# Patient Record
Sex: MALE | Race: WHITE | NOT HISPANIC OR LATINO | Employment: FULL TIME | URBAN - METROPOLITAN AREA
[De-identification: names, ages, dates, MRNs, and addresses within clinical notes are randomized per-mention and may not be internally consistent; named-entity substitution may affect disease eponyms.]

---

## 2024-04-15 PROBLEM — H93.13 TINNITUS, BILATERAL: Status: ACTIVE | Noted: 2024-04-15

## 2024-04-15 PROBLEM — H61.20 EXCESSIVE CERUMEN IN EAR CANAL: Status: ACTIVE | Noted: 2024-04-15

## 2024-04-15 PROBLEM — H90.6 MIXED HEARING LOSS, BILATERAL: Status: ACTIVE | Noted: 2024-04-15

## 2024-04-15 PROBLEM — H60.502 ACUTE OTITIS EXTERNA OF LEFT EAR: Status: ACTIVE | Noted: 2024-04-15

## 2024-04-15 PROBLEM — J35.2 ADENOIDAL HYPERTROPHY: Status: ACTIVE | Noted: 2024-04-15

## 2024-04-15 PROBLEM — H90.3 SENSORINEURAL HEARING LOSS, ASYMMETRICAL: Status: ACTIVE | Noted: 2024-04-15

## 2024-04-15 PROBLEM — H65.22 LEFT CHRONIC SEROUS OTITIS MEDIA: Status: ACTIVE | Noted: 2024-04-15

## 2024-04-15 RX ORDER — IRBESARTAN 300 MG/1
300 TABLET ORAL DAILY
COMMUNITY
Start: 2021-03-11

## 2024-04-20 NOTE — PROGRESS NOTES
Subjective   Patient ID: Prakash Villalta II is a 58 y.o. male who presents for No chief complaint on file..  HPI  This 58-year-old male last seen in this office in April 2023 by one of my previous associates is being seen in follow-up.  He has a history of mixed hearing loss in both ears complicated at times by excessive cerumen buildup.  It appears by old records that he did at times have some granular tissue involving the left tympanic membrane causing otorrhea which was treated by Ciprodex drops.  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  According to his last examination there was in the left ear by exam and myringostapediopexy bilaterally.  Review of Systems  A 12 point ROS has been reviewed and are negative for complaint except what is stated in the history of present illness and/or past medical history as noted in the EMR    Past Medical History:   Diagnosis Date    Personal history of other diseases of the circulatory system     History of hypertension        Current Outpatient Medications:     irbesartan (Avapro) 300 mg tablet, Take 1 tablet (300 mg) by mouth once daily., Disp: , Rfl:      Past Surgical History:   Procedure Laterality Date    ADENOIDECTOMY  08/11/2016    Adenoidectomy    NASAL SEPTUM SURGERY  08/11/2016    Nasal Septal Deviation Repair    TYMPANOSTOMY TUBE PLACEMENT  08/11/2016    Ear Pressure Equalization Tube      Social History     Tobacco Use    Smoking status: Not on file    Smokeless tobacco: Not on file   Substance Use Topics    Alcohol use: Not on file        Not on File     There were no vitals taken for this visit.       Objective   Physical Exam  EXAMINATION:    GENERAL SINDI.EARANCE: Alert, in no acute distress, normal pitch and clarity of voice, well-developed and nourished, cooperative.    HEAD/FACE:  Normocephalic, atraumatic, normal facial movements and strength, no no tenderness to palpation, no lesions noted.    SKIN: Normal turgor, no raised or ulcerative lesions, warm and dry to palpation.    EYES: Extraocular motions intact, no nystagmus noted, pupils equal and reactive to light and accommodation, no conjunctivitis.    EARS: Both ears--external ear anatomy is normal without lesions, auditory canals are patent and without skin abrasions or lesions, hearing is intact to voice, t see procedure note for remaining exam     NOSE: No external skin lesions are noted, nares are patent, septum is intact, sinuses are nontender to palpation bilaterally, no intranasal lesions or inflammation is noted, nasal valve is normal.    OROPHARYNX/ORAL CAVITY: Oropharynx is not inflamed and is without lesions, mucosa of the oral cavity is intact and without lesions, tongue is midline and mobile, no acute dental disease is noted, TMJs are mobile, positive submucous cleft to palpation    LUNG-- NO wheezing or rhonchi normal respiratory effort    HEART-- No venous congestion,  rate and rhythm regular,    NECK: No lymphadenopathy is palpated, carotid pulses are intact, neck is supple with full range of motion, no thyroid abnormalities are noted, trachea is midline, no neck masses are palpated.    LYMPHATICS: No cervical adenopathy or supraclavicular adenopathy is palpated.    NEUROLOGIC/PSYCH; alert and oriented, cranial nerves are grossly intact, gait is without falling, no motor deficits are noted.        Patient ID: Prakash Villalta II is a 58 y.o. male.    Ear cerumen removal    Date/Time: 4/24/2024 3:53 PM    Performed by: Tye Hayward DMD, MD  Authorized by: Tye Hayward DMD, MD    Consent:     Consent obtained:  Verbal    Consent given by:  Patient    Risks discussed:  Pain and incomplete removal    Alternatives discussed:  No treatment and alternative treatment  Universal protocol:     Procedure explained and  questions answered to patient or proxy's satisfaction: yes      Imaging studies available: no      Required blood products, implants, devices, and special equipment available: no      Patient identity confirmed:  Verbally with patient  Procedure details:     Location:  L ear and R ear    Procedure type: curette      Procedure type comment:  Or suction    Procedure outcomes: cerumen removed    Post-procedure details:     Inspection:  No bleeding and ear canal clear    Hearing quality:  Improved    Procedure completion:  Tolerated well, no immediate complications  Comments:      Microscopic evaluation of both ears was done.  On the right-hand side there was ceruminous debris in the anterior one third of the canal removed with a curette.  The tympanic membrane was negative for middle ear fluid however there was significant retraction of the posterior TM within myringostapediopexy.  There was some soft tissue debris around the attic defect that was removed with suction and tab knife.  No active otorrhea or granulation tissue was noted.  On the left-hand side there was some ceruminous buildup in the anterior third of the canal removed with a curette.  There was in the remaining of the canal dried blood and secretions obstructed to the canal which was lifted and removed with tab knife alligator forceps.  At the level of the tympanic membrane posteriorly and in the attic area there was what appeared to be a significant retraction with granulation tissue very friable.  There was some dried debris around the edges which could not be removed without further irritating the granulation tissue.  There did not appear to be an active buildup of middle ear fluid.    Assessment/Plan   Problem List Items Addressed This Visit             ICD-10-CM    Mixed hearing loss, bilateral - Primary H90.6    Tinnitus, bilateral H93.13     Other Visit Diagnoses         Codes    Attic retraction pocket     H73.899    Granulation tissue of ear  canal     L92.9          I discussed the clinical finds with the patient.  He does have significant scarring of both tympanic membranes however on the left-hand side there is granulation tissue in the posterior superior quadrant with surrounding dried epithelial and blood.  This was cleaned down to that level however a small amount was a necessarily left behind due to the granulation tissue and risk of bleeding.  He will be placed on Ciprodex drops 3 drops twice a day for 10 days then at night until seen back in the office in 4 weeks.  Will plan to do his hearing test at that follow-up visit.  Once this seems to be stabilized then he will stay on a checkup scheduled to monitor the situation.  If his hearing changes or if there is any suggestion of ingrowth of epithelium in these defects and a CT scan of the temporal bone would be advised.     Tye Hayward DMD, MD 04/20/24 1:48 PM

## 2024-04-24 ENCOUNTER — CLINICAL SUPPORT (OUTPATIENT)
Dept: AUDIOLOGY | Facility: CLINIC | Age: 59
End: 2024-04-24
Payer: COMMERCIAL

## 2024-04-24 ENCOUNTER — CLINICAL SUPPORT (OUTPATIENT)
Dept: AUDIOLOGY | Facility: CLINIC | Age: 59
End: 2024-04-24

## 2024-04-24 ENCOUNTER — OFFICE VISIT (OUTPATIENT)
Dept: OTOLARYNGOLOGY | Facility: CLINIC | Age: 59
End: 2024-04-24
Payer: COMMERCIAL

## 2024-04-24 VITALS — BODY MASS INDEX: 29.8 KG/M2 | WEIGHT: 220 LBS | HEIGHT: 72 IN

## 2024-04-24 DIAGNOSIS — H93.13 TINNITUS, BILATERAL: ICD-10-CM

## 2024-04-24 DIAGNOSIS — L92.9 GRANULATION TISSUE OF EAR CANAL: ICD-10-CM

## 2024-04-24 DIAGNOSIS — H90.6 MIXED HEARING LOSS, BILATERAL: Primary | ICD-10-CM

## 2024-04-24 DIAGNOSIS — H61.23 BILATERAL IMPACTED CERUMEN: ICD-10-CM

## 2024-04-24 DIAGNOSIS — H69.91 ETD (EUSTACHIAN TUBE DYSFUNCTION), RIGHT: Primary | ICD-10-CM

## 2024-04-24 DIAGNOSIS — H73.899 ATTIC RETRACTION POCKET: ICD-10-CM

## 2024-04-24 PROCEDURE — 69210 REMOVE IMPACTED EAR WAX UNI: CPT | Performed by: OTOLARYNGOLOGY

## 2024-04-24 PROCEDURE — 92567 TYMPANOMETRY: CPT | Performed by: AUDIOLOGIST

## 2024-04-24 PROCEDURE — 99214 OFFICE O/P EST MOD 30 MIN: CPT | Performed by: OTOLARYNGOLOGY

## 2024-04-24 PROCEDURE — V5267 HEARING AID SUP/ACCESS/DEV: HCPCS | Performed by: AUDIOLOGIST

## 2024-04-24 PROCEDURE — 1036F TOBACCO NON-USER: CPT | Performed by: OTOLARYNGOLOGY

## 2024-04-24 RX ORDER — ATORVASTATIN CALCIUM 20 MG/1
20 TABLET, FILM COATED ORAL DAILY
COMMUNITY
Start: 2024-01-29

## 2024-04-24 RX ORDER — CIPROFLOXACIN AND DEXAMETHASONE 3; 1 MG/ML; MG/ML
SUSPENSION/ DROPS AURICULAR (OTIC)
Qty: 7.5 ML | Refills: 1 | Status: SHIPPED | OUTPATIENT
Start: 2024-04-24

## 2024-04-24 RX ORDER — HYDROCHLOROTHIAZIDE 25 MG/1
25 TABLET ORAL DAILY
COMMUNITY
Start: 2024-01-29

## 2024-04-24 NOTE — PROGRESS NOTES
TYMPANOMETRY EVALUATION      Name:  Prakash Villalta II  :  1965  Age:  58 y.o.  Date of Evaluation:  24   Referring Provider:  Dr. Hayward     History:  Mr. Pawan CANTU was seen today for an evaluation of hearing.  Patient reported tinnitus, bilaterally and needing to pop his left ear.  When asked, patient denied otalgia, aural fullness, and concerns for decreased hearing sensitivity    See audiometric evaluation at end of this report or scanned under media tab    OTOSCOPY:       Right Ear: Minimal non-occluding cerumen       Left Ear: Abnormal appearance to TM/discoloration    226 Hz TYMPANOMETRY:       Right Ear: Type B: Flat with large ear canal volume, consistent with patent PE tube or perforation       Left Ear: Type A: normal peak pressure, compliance, and ear canal volume, consistent with middle ear function within normal limits    NOTE: Due to abnormal appearance of left otoscopy and concern for wax/medical involvement, patient was brought to Dr. Hayward.  Dr. Hayward decided to defer remainder of audiometric evaluation at this time.    RECOMMENDATIONS:  -Recommend patient return should concerns for changes in hearing sensitivity arise or as medically indicated.     Kenya Khan, CCC-A     Appt: 3:00 - 3:15 PM

## 2024-04-24 NOTE — PROGRESS NOTES
[facility]  HEARING AID CHECK      Name:  Prakash Villalta II  YOB: 1965  Today's date:  04/24/24      PATIENT ISSUES/CONCERNS AND OTOSCOPIC RESULTS  Otoscopic was clear following Dr. Hayward visit.  Patient reports that the hearing aids have been working well.    HEARING AID INSPECTION AND ADJUSTMENT  Hearing aids were cleaned and checked.  Replaced filters, domes and sport locks.  Left  wire somewhat loose but not intermittent.  Listening check was good.        Patient reports no issues or concerns.  No need for adjustment today.    FOLLOW UP   The patient was asked to contact the office if they notice any significant change in hearing level or hearing aid function.    Otherwise, will follow up again in 6 months.    APPOINTMENT TIME  4:00-4:30pm    Kenya Roque  Doctor of Audiology  Senior Audiologist

## 2024-05-16 NOTE — PROGRESS NOTES
Subjective   Patient ID: Prakash Villalta II is a 59 y.o. male who presents for No chief complaint on file..  HPI  This 59-year-old male is being seen back in the office.  He had previously been followed for a number of years by one of my previous associates.  He has a history of mixed hearing loss in both ears and a history of intermittent cerumen impactions.  He has had granulation tissue involving the left tympanic membrane the past causing otorrhea.  Many years earlier he had undergone nasal surgery including septoplasty turbinate reduction and he had myringotomy tubes used at that time.  Past exams have shown evidence for myringostapediopexy's bilaterally.  At his last visit, after removing cerumen on the left side there was dried blood and mucoid secretions which was removed and at the level of the tympanic membrane and in the attic area there was significant retraction with granulation tissue.  He was placed on Ciprodex drops twice a day for 10 days and then at night until seen back for this visit.  I had mentioned the possibility of obtaining a CT scan of the temporal bone if necessary for further evaluation.  He also has a posterior superior retraction on the right which collects some skin residue.  No otorrhea has been noted on that side.  Review of Systems  A 12 point ROS has been reviewed and are negative for complaint except what is stated in the history of present illness and/or past medical history as noted in the EMR    Active Ambulatory Problems     Diagnosis Date Noted    Acute otitis externa of left ear 04/15/2024    Adenoidal hypertrophy 04/15/2024    Excessive cerumen in ear canal 04/15/2024    Left chronic serous otitis media 04/15/2024    Mixed hearing loss, bilateral 04/15/2024    Sensorineural hearing loss, asymmetrical 04/15/2024    Tinnitus, bilateral 04/15/2024     Resolved Ambulatory Problems     Diagnosis Date Noted    No Resolved Ambulatory Problems     Past Medical History:   Diagnosis  Date    Personal history of other diseases of the circulatory system            Current Outpatient Medications:     atorvastatin (Lipitor) 20 mg tablet, Take 1 tablet (20 mg) by mouth once daily., Disp: , Rfl:     ciprofloxacin-dexamethasone (CiproDEX) otic suspension, 3 drops twice a day to the left ear for 10 days then at night until seen back in the office., Disp: 7.5 mL, Rfl: 1    hydroCHLOROthiazide (HYDRODiuril) 25 mg tablet, Take 1 tablet (25 mg) by mouth once daily., Disp: , Rfl:     irbesartan (Avapro) 300 mg tablet, Take 1 tablet (300 mg) by mouth once daily., Disp: , Rfl:      No Known Allergies     Social History     Tobacco Use    Smoking status: Never    Smokeless tobacco: Never   Substance Use Topics    Alcohol use: Not Currently      Objective   Physical Exam  EXAMINATION:     GENERAL SINDI.EARANCE: Alert, in no acute distress, normal pitch and clarity of voice, well-developed and nourished, cooperative.     HEAD/FACE: Normocephalic, atraumatic, normal facial movements and strength, no no tenderness to palpation, no lesions noted.     SKIN: Normal turgor, no raised or ulcerative lesions, warm and dry to palpation.     EYES: Extraocular motions intact, no nystagmus noted, pupils equal and reactive to light and accommodation, no conjunctivitis.     EARS: See procedure note      NOSE: No external skin lesions are noted, nares are patent, septum is intact, sinuses are nontender to palpation bilaterally, no intranasal lesions or inflammation is noted, nasal valve is normal.     OROPHARYNX/ORAL CAVITY: Oropharynx is not inflamed and is without lesions, mucosa of the oral cavity is intact and without lesions, tongue is midline and mobile, no acute dental disease is noted, TMJs are mobile, positive submucous cleft to palpation     LUNG-- NO wheezing or rhonchi normal respiratory effort     HEART-- No venous congestion,  rate and rhythm regular,     NECK: No lymphadenopathy is palpated, carotid pulses are  intact, neck is supple with full range of motion, no thyroid abnormalities are noted, trachea is midline, no neck masses are palpated.     LYMPHATICS: No cervical adenopathy or supraclavicular adenopathy is palpated.     NEUROLOGIC/PSYCH; alert and oriented, cranial nerves are grossly intact, gait is without falling, no motor deficits are noted.    Patient ID: Prakash Villalta II is a 59 y.o. male.    Binocular microscopy    Date/Time: 5/20/2024 3:51 PM    Performed by: Tye Hayward DMD, MD  Authorized by: Tye Hayward DMD, MD    Consent:     Consent obtained:  Verbal    Consent given by:  Patient    Risks discussed:  Pain    Alternatives discussed:  No treatment and observation  Post-procedure details:     Patient tolerance of procedure:  Tolerated well, no immediate complications  Comments:      Microscopic evaluation on the right-hand side revealed a posterior TM retraction especially in the posterior superior quadrant.  There is a pocket there with some epithelial debris within it which was able to be mostly removed with a Britt needle and cup forceps.  No middle ear fluid was noted.  On the left-hand side there was dried blood and secretions overlapping the posterior superior quadrant.  This was lifted with a Britt needle and removed with an alligator forcep.  A deeper retraction pocket in the attic is noted with still some remnant of granulation tissue.  This could be consistent with an attic cholesteatoma.    His audiogram today was positive for bilateral mixed hearing loss moderate in both ears at 125 Hz mild on the right ear from 500 to 2000 Hz then a moderate loss is noted in the upper frequencies of sound on the left-hand side there with similar readings except for normal thresholds at 15 102,000 Hz.  Discrimination scores are 92% bilaterally 60 dB on the right 50 dB on the left.  Tympanograms revealed a type a pattern on the left tympanic membrane perforation pattern on the right Assessment/Plan    Problem List Items Addressed This Visit             ICD-10-CM    Mixed hearing loss, bilateral - Primary H90.6    Tinnitus, bilateral H93.13     Other Visit Diagnoses         Codes    Attic retraction pocket     H73.899    Relevant Orders    CT internal auditory canals posterior fossa wo IV contrast    Granulation tissue of ear canal     L92.9    Otorrhea of left ear     H92.12    Cholesteatoma of attic of both ears     H71.03    Relevant Orders    CT internal auditory canals posterior fossa wo IV contrast             I discussed the clinical finds with the patient.  His left ear otorrhea and bleeding has improved with the use of the Ciprodex.  Now that the ear is cleaned there is a small area of granulation tissue is still present in that attic retraction area and I we will continue him on the Ciprodex drops at night until it is completed.  CT scan of the temporal bone is going to be ordered in order to see if there is any suggestion of an attic cholesteatoma which would require surgical care.  Will need to review those results and if it is suggestive of that then a consultation with otology would be recommended.  If not then he should be kept on a's cleaning schedule using Floxin drops intermittently to lubricate these areas.    From a hearing standpoint he is mostly stable with only very slight fluctuations from past test.  Discrimination ability is slightly lower than it was 1 year ago but is still in a normal range at 92%.    He will be contacted with the results of the CT scan of the temporal bone with any other short-term assessments that he needs otherwise he should be seen again in July for ear debridement.    This patient is advised to follow up with their PCP for all other health care issues and treatment. Dictation was done with dragon transcription and errors in spelling  and diction are possible.    Tye Hayward DMD, MD 05/16/24 5:14 PM

## 2024-05-20 ENCOUNTER — OFFICE VISIT (OUTPATIENT)
Dept: OTOLARYNGOLOGY | Facility: CLINIC | Age: 59
End: 2024-05-20
Payer: COMMERCIAL

## 2024-05-20 ENCOUNTER — APPOINTMENT (OUTPATIENT)
Dept: AUDIOLOGY | Facility: CLINIC | Age: 59
End: 2024-05-20
Payer: COMMERCIAL

## 2024-05-20 ENCOUNTER — CLINICAL SUPPORT (OUTPATIENT)
Dept: AUDIOLOGY | Facility: CLINIC | Age: 59
End: 2024-05-20
Payer: COMMERCIAL

## 2024-05-20 DIAGNOSIS — H71.03 CHOLESTEATOMA OF ATTIC OF BOTH EARS: ICD-10-CM

## 2024-05-20 DIAGNOSIS — H73.899 ATTIC RETRACTION POCKET: ICD-10-CM

## 2024-05-20 DIAGNOSIS — L92.9 GRANULATION TISSUE OF EAR CANAL: ICD-10-CM

## 2024-05-20 DIAGNOSIS — H90.6 MIXED HEARING LOSS, BILATERAL: Primary | ICD-10-CM

## 2024-05-20 DIAGNOSIS — H93.13 TINNITUS, BILATERAL: ICD-10-CM

## 2024-05-20 DIAGNOSIS — H72.11 ATTIC PERFORATION OF TYMPANIC MEMBRANE OF RIGHT EAR: ICD-10-CM

## 2024-05-20 DIAGNOSIS — H92.12 OTORRHEA OF LEFT EAR: ICD-10-CM

## 2024-05-20 PROCEDURE — 99214 OFFICE O/P EST MOD 30 MIN: CPT | Performed by: OTOLARYNGOLOGY

## 2024-05-20 PROCEDURE — 92567 TYMPANOMETRY: CPT | Performed by: AUDIOLOGIST

## 2024-05-20 PROCEDURE — 1036F TOBACCO NON-USER: CPT | Performed by: OTOLARYNGOLOGY

## 2024-05-20 PROCEDURE — 92557 COMPREHENSIVE HEARING TEST: CPT | Performed by: AUDIOLOGIST

## 2024-05-20 PROCEDURE — 1036F TOBACCO NON-USER: CPT | Performed by: AUDIOLOGIST

## 2024-05-20 PROCEDURE — 92504 EAR MICROSCOPY EXAMINATION: CPT | Performed by: OTOLARYNGOLOGY

## 2024-05-20 ASSESSMENT — PATIENT HEALTH QUESTIONNAIRE - PHQ9
2. FEELING DOWN, DEPRESSED OR HOPELESS: NOT AT ALL
1. LITTLE INTEREST OR PLEASURE IN DOING THINGS: NOT AT ALL
SUM OF ALL RESPONSES TO PHQ9 QUESTIONS 1 AND 2: 0

## 2024-05-20 ASSESSMENT — COLUMBIA-SUICIDE SEVERITY RATING SCALE - C-SSRS: 1. IN THE PAST MONTH, HAVE YOU WISHED YOU WERE DEAD OR WISHED YOU COULD GO TO SLEEP AND NOT WAKE UP?: NO

## 2024-05-20 NOTE — PROGRESS NOTES
[facility]  AUDIOMETRIC EVALUATION      Name:  Prakash Villalta II   :  1965  Age:  59 y.o.  Date of Evaluation:  24    HISTORY    Prakash Villalta II is seen today at the request of Tye Hayward M.D., ADAM., F.A.C.S.    EVALUATION  See scanned audiogram in Media and included at the end of this report.    RESULTS    Otoscopic Evaluation:  Both ears were examined and cleaned under the microscope by Dr. Hayward prior to the audiometric exam.    Tympanometry:   Right Ear:  large ear canal volume, consistent with a patent myringotomy tube or tympanic membrane perforation   Left Ear:  Type A, consistent with normal eardrum mobility and middle ear pressure    Acoustic reflexes were not completed    Pure Tone Audiometry:    Right Ear:  mild to severe mixed hearing loss  Left Ear:  normal to moderate mixed hearing loss       Speech Audiometry:    Right Ear:  excellent in quiet at an elevated presentation level  Left Ear:  excellent in quiet at a normal presentation level  Speech reception thresholds were in good agreement with pure tone testing.    DISCUSSION  Results were relayed to Tye Hayward M.D., ADAM., F.A.C.S.    APPOINTMENT TIME  3:30-4:00pm      Philly Roque  Doctor of Audiology  Senior Audiologist

## 2024-05-28 ENCOUNTER — APPOINTMENT (OUTPATIENT)
Dept: AUDIOLOGY | Facility: CLINIC | Age: 59
End: 2024-05-28
Payer: COMMERCIAL

## 2024-06-03 ENCOUNTER — HOSPITAL ENCOUNTER (OUTPATIENT)
Dept: RADIOLOGY | Facility: CLINIC | Age: 59
Discharge: HOME | End: 2024-06-03
Payer: COMMERCIAL

## 2024-06-03 DIAGNOSIS — H73.899 ATTIC RETRACTION POCKET: ICD-10-CM

## 2024-06-03 DIAGNOSIS — H71.03 CHOLESTEATOMA OF ATTIC OF BOTH EARS: ICD-10-CM

## 2024-06-03 PROCEDURE — 70480 CT ORBIT/EAR/FOSSA W/O DYE: CPT

## 2024-06-03 PROCEDURE — 70480 CT ORBIT/EAR/FOSSA W/O DYE: CPT | Performed by: RADIOLOGY

## 2024-06-04 ENCOUNTER — TELEPHONE (OUTPATIENT)
Dept: OTOLARYNGOLOGY | Facility: CLINIC | Age: 59
End: 2024-06-04
Payer: COMMERCIAL

## 2024-06-04 DIAGNOSIS — L92.9 GRANULATION TISSUE OF EAR CANAL: ICD-10-CM

## 2024-06-04 DIAGNOSIS — H71.01 CHOLESTEATOMA OF ATTIC OF RIGHT EAR: Primary | ICD-10-CM

## 2024-06-04 NOTE — RESULT ENCOUNTER NOTE
Please call--the CT scan of the temporal bone was reviewed.  There were findings on the right-hand side of some soft tissue in the upper part of the middle ear which could relate to ingrowth of epithelium or skin into that region.  There did not appear to be any fluid buildup on the right side.  On the left side there did not appear to be any abnormalities within the middle ear and both mastoids were very shallow developmentally.  There did not appear to be any extension of inflammation into those small mastoid sinus areas.  I do feel we need to ask one of the otologist i.e. ENT providers specifically trained only for ear problems, to see you in regards to the findings on exam as well as the findings in the right ear on the CT scan.  Dr. Houser does come down to this office 1 clinic day per month and we can arrange a visit with him.  There is a follow-up with me in July scheduled as well which will keep for the moment.

## 2024-06-04 NOTE — TELEPHONE ENCOUNTER
----- Message from Tye Hayward DMD, MD sent at 6/4/2024 12:12 PM EDT -----  Please call--the CT scan of the temporal bone was reviewed.  There were findings on the right-hand side of some soft tissue in the upper part of the middle ear which could relate to ingrowth of epithelium or skin into that region.  There did not appear to be any fluid buildup on the right side.  On the left side there did not appear to be any abnormalities within the middle ear and both mastoids were very shallow developmentally.  There did not appear to be any extension of inflammation into those small mastoid sinus areas.  I do feel we need to ask one of the otologist i.e. ENT providers specifically trained only for ear problems, to see you in regards to the findings on exam as well as the findings in the right ear on the CT scan.  Dr. Houser does come down to this office 1 clinic day per month and we can arrange a visit with him.  There is a follow-up with me in July scheduled as well which will keep for the moment.

## 2024-07-12 ENCOUNTER — APPOINTMENT (OUTPATIENT)
Dept: OTOLARYNGOLOGY | Facility: CLINIC | Age: 59
End: 2024-07-12
Payer: COMMERCIAL

## 2024-07-12 DIAGNOSIS — Z01.818 PREOPERATIVE CLEARANCE: ICD-10-CM

## 2024-07-12 DIAGNOSIS — H93.13 TINNITUS, BILATERAL: ICD-10-CM

## 2024-07-12 DIAGNOSIS — H90.6 MIXED HEARING LOSS, BILATERAL: ICD-10-CM

## 2024-07-12 DIAGNOSIS — H74.23 DISCONTINUITY OF OSSICLES OF BOTH EARS: ICD-10-CM

## 2024-07-12 DIAGNOSIS — H73.899 ATTIC RETRACTION POCKET: ICD-10-CM

## 2024-07-12 DIAGNOSIS — H61.23 BILATERAL IMPACTED CERUMEN: ICD-10-CM

## 2024-07-12 DIAGNOSIS — H71.01 CHOLESTEATOMA OF ATTIC OF RIGHT EAR: Primary | ICD-10-CM

## 2024-07-12 DIAGNOSIS — L92.9 GRANULATION TISSUE OF EAR CANAL: ICD-10-CM

## 2024-07-12 DIAGNOSIS — H74.13 ADHESIVE MIDDLE EAR DISEASE OF BOTH SIDES: ICD-10-CM

## 2024-07-12 PROCEDURE — 99204 OFFICE O/P NEW MOD 45 MIN: CPT | Performed by: OTOLARYNGOLOGY

## 2024-07-12 PROCEDURE — 69210 REMOVE IMPACTED EAR WAX UNI: CPT | Performed by: OTOLARYNGOLOGY

## 2024-07-12 ASSESSMENT — PAIN SCALES - GENERAL: PAINLEVEL: 0-NO PAIN

## 2024-07-12 ASSESSMENT — COLUMBIA-SUICIDE SEVERITY RATING SCALE - C-SSRS: 1. IN THE PAST MONTH, HAVE YOU WISHED YOU WERE DEAD OR WISHED YOU COULD GO TO SLEEP AND NOT WAKE UP?: NO

## 2024-07-12 NOTE — LETTER
"July 16, 2024     Tye Hayward DMD, MD  [address] [facility] [address]    Patient: Pierre Villalta II   YOB: 1965   Date of Visit: 7/12/2024       Dear Dr. Tye Hayward DMD, MD:    Thank you for referring Pierre Villalta II to me for evaluation. Below are my notes for this consultation.  If you have questions, please do not hesitate to call me. I look forward to following your patient along with you.       Sincerely,     Eitan Gill MD      CC: Landon Morales MD  ______________________________________________________________________________________            Reason for Consult:  Otitis Media and Follow-up     Subjective  History Of Present Illness:  Prakash Villalta II \"Frederick" is a 59 y.o. male with bilateral chronic ear disease and has had multiple PE tubes throughout life. This has resulted in bilateral mixed hearing loss with 10dB airborne gap. He visited Dr. Hayward for right ear drainage and was provided with anti-biotic drops. He completed a CT IAC that demonstrated opacification in the right ear concerning for cholesteatoma for which Dr. Hayward referred to this clinic for evaluation and treatment.    Past Medical History:  He has a past medical history of Personal history of other diseases of the circulatory system.    Surgical History:  He has a past surgical history that includes Tympanostomy tube placement (08/11/2016); Nasal septum surgery (08/11/2016); and Adenoidectomy (08/11/2016).     Social History:  He reports that he has never smoked. He has never used smokeless tobacco. He reports that he does not currently use alcohol. He reports that he does not use drugs.    Family History:  family history is not on file.     Medications:  Current Outpatient Medications   Medication Instructions   • atorvastatin (LIPITOR) 20 mg, oral, Daily   • ciprofloxacin-dexamethasone (CiproDEX) otic suspension 3 drops twice a day to the left ear for 10 days then at night until seen back in the " office.   • hydroCHLOROthiazide (HYDRODIURIL) 25 mg, oral, Daily   • irbesartan (AVAPRO) 300 mg, oral, Daily      Allergies:  Patient has no known allergies.    Review of Systems:   A comprehensive 10-point review of systems was obtained including constitutional, neurological, HEENT, pulmonary, cardiovascular, genito-urinary, and other pertinent systems and was negative except as noted in the HPI.     Objective  Physical Exam:  Last Recorded Vitals: There were no vitals taken for this visit.    On physical exam, the patient is a well-nourished, well-developed patient, in no acute distress, able to communicate without assistance in English language. Head and face is atraumatic and normocephalic. Salivary glands are intact. Facial strength is symmetrical bilaterally.       On ear examination:  Right ear: The patient has cerumen impaction which was removed. Adhesive middle ear disease with forming cholesteatoma in the retro-tympanum and potential erosion of the IS joint.   BC>AC  Left ear: The patient has cerumen impaction which was removed. Atrophic and atelectatic tympanic membrane with flimsy drum and erosion of the incus with stapedo pexy. The drum is in perfect contact with the capitulum.  AC>BC  The Duran is right    On vestibular exam, the patient has no spontaneous nystagmus, no headshake nystagmus, no head-thrust nystagmus, and no nystagmus on hyperventilation or Valsalva maneuvers. [place]-Hallpike maneuver is negative bilaterally.       On neuro exam, the patient is alert and oriented x3, cranial nerves are grossly intact, cerebellar exam is normal.      The rest of the exam, including anterior rhinoscopy, oropharyngeal exam, neck exam, and cardiovascular exam, were normal including no palpable lymphadenopathies, thyroid in the midline position, normal pulses, and normal chest excursion.       Reviewed Results:  Audiology Testing:  I personally reviewed the audiogram from 05/2024 that showed:   Right ear:  "moderate mixed hearing loss with 10 decibels of airborne gap. Discrimination: 92%   Left ear: moderate mixed hearing loss with 10 decibels of airborne gap. Discrimination: 92%    Imaging:  I personally reviewed the CT of the temporal bone from 06/03/24 that showed:  Right sided cholesteatoma of the meso and retro tympanum with an intact stapes and possible incus erosion. The mastoid is clean. On the left side, he has an atelectatic drum with erosion of the long process of the incus.    Procedure:  None    Assessment/Plan    1. Cholesteatoma of attic of right ear    2. Adhesive middle ear disease of both sides    3. Discontinuity of ossicles of both ears    4. Mixed hearing loss, bilateral    5. Tinnitus, bilateral    6. Bilateral impacted cerumen    7. Preoperative clearance        In summary, Prakash Villalta II \"Pierre\" is a 59 y.o. male with bilateral adhesive middle ear disease and multiple PE tubes in the past and current bilateral mixed hearing loss.  The left side is his better hearing ear. He has adhesive middle ear disease with erosion of the long process of the incus and pexy over the stapes providing good conduction of sound.     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      The risks, indications and complications of doing a right sided endoscopy tympanoplasty, antrotomy, possible ossicular chain reconstruction and removal of choleteatoma was discussed with the patient and he elected to proceed. We will schedule this in the near future.       ____________________________________________________  Eitan Houser MD  Professor and Chief   Otology/Neurotology/Lateral Skull-Base Surgery   [facility]     Scribe Attestation  By signing my name below, I, Sukhdeep Paula, Scribe   attest that this documentation has been prepared under the direction and in the presence of " Eitan Gill MD.

## 2024-07-12 NOTE — PROGRESS NOTES
"        Reason for Consult:  Otitis Media and Follow-up     Subjective   History Of Present Illness:  Prakash Villalta II \"Pierre\" is a 59 y.o. male with bilateral chronic ear disease and has had multiple PE tubes throughout life. This has resulted in bilateral mixed hearing loss with 10dB airborne gap. He visited Dr. Hayward for right ear drainage and was provided with anti-biotic drops. He completed a CT IAC that demonstrated opacification in the right ear concerning for cholesteatoma for which Dr. Hayward referred to this clinic for evaluation and treatment.    Past Medical History:  He has a past medical history of Personal history of other diseases of the circulatory system.    Surgical History:  He has a past surgical history that includes Tympanostomy tube placement (08/11/2016); Nasal septum surgery (08/11/2016); and Adenoidectomy (08/11/2016).     Social History:  He reports that he has never smoked. He has never used smokeless tobacco. He reports that he does not currently use alcohol. He reports that he does not use drugs.    Family History:  family history is not on file.     Medications:  Current Outpatient Medications   Medication Instructions    atorvastatin (LIPITOR) 20 mg, oral, Daily    ciprofloxacin-dexamethasone (CiproDEX) otic suspension 3 drops twice a day to the left ear for 10 days then at night until seen back in the office.    hydroCHLOROthiazide (HYDRODIURIL) 25 mg, oral, Daily    irbesartan (AVAPRO) 300 mg, oral, Daily      Allergies:  Patient has no known allergies.    Review of Systems:   A comprehensive 10-point review of systems was obtained including constitutional, neurological, HEENT, pulmonary, cardiovascular, genito-urinary, and other pertinent systems and was negative except as noted in the HPI.     Objective   Physical Exam:  Last Recorded Vitals: There were no vitals taken for this visit.    On physical exam, the patient is a well-nourished, well-developed patient, in no acute " distress, able to communicate without assistance in English language. Head and face is atraumatic and normocephalic. Salivary glands are intact. Facial strength is symmetrical bilaterally.       On ear examination:  Right ear: The patient has cerumen impaction which was removed. Adhesive middle ear disease with forming cholesteatoma in the retro-tympanum and potential erosion of the IS joint.   BC>AC  Left ear: The patient has cerumen impaction which was removed. Atrophic and atelectatic tympanic membrane with flimsy drum and erosion of the incus with stapedo pexy. The drum is in perfect contact with the capitulum.  AC>BC  The Duran is right    On vestibular exam, the patient has no spontaneous nystagmus, no headshake nystagmus, no head-thrust nystagmus, and no nystagmus on hyperventilation or Valsalva maneuvers. [place]-Hallpike maneuver is negative bilaterally.       On neuro exam, the patient is alert and oriented x3, cranial nerves are grossly intact, cerebellar exam is normal.      The rest of the exam, including anterior rhinoscopy, oropharyngeal exam, neck exam, and cardiovascular exam, were normal including no palpable lymphadenopathies, thyroid in the midline position, normal pulses, and normal chest excursion.       Reviewed Results:  Audiology Testing:  I personally reviewed the audiogram from 05/2024 that showed:   Right ear: moderate mixed hearing loss with 10 decibels of airborne gap. Discrimination: 92%   Left ear: moderate mixed hearing loss with 10 decibels of airborne gap. Discrimination: 92%    Imaging:  I personally reviewed the CT of the temporal bone from 06/03/24 that showed:  Right sided cholesteatoma of the meso and retro tympanum with an intact stapes and possible incus erosion. The mastoid is clean. On the left side, he has an atelectatic drum with erosion of the long process of the incus.    Procedure:  None    Assessment/Plan     1. Cholesteatoma of attic of right ear    2. Adhesive middle  "ear disease of both sides    3. Discontinuity of ossicles of both ears    4. Mixed hearing loss, bilateral    5. Tinnitus, bilateral    6. Bilateral impacted cerumen    7. Preoperative clearance        In summary, Prakash Villalta II \"Pierre\" is a 59 y.o. male with bilateral adhesive middle ear disease and multiple PE tubes in the past and current bilateral mixed hearing loss.  The left side is his better hearing ear. He has adhesive middle ear disease with erosion of the long process of the incus and pexy over the stapes providing good conduction of sound.     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      The risks, indications and complications of doing a right sided endoscopy tympanoplasty, antrotomy, possible ossicular chain reconstruction and removal of choleteatoma was discussed with the patient and he elected to proceed. We will schedule this in the near future.       ____________________________________________________  Eitan Houser MD  Professor and Chief   Otology/Neurotology/Lateral Skull-Base Surgery   [facility]     Scribe Attestation  By signing my name below, I, Sukhdeep Paula, Scribe   attest that this documentation has been prepared under the direction and in the presence of Eitan Gill MD.   "

## 2024-07-16 RX ORDER — CEFAZOLIN SODIUM 2 G/100ML
2 INJECTION, SOLUTION INTRAVENOUS ONCE
OUTPATIENT
Start: 2024-07-16 | End: 2024-07-16

## 2024-07-16 RX ORDER — SODIUM CHLORIDE 9 MG/ML
100 INJECTION, SOLUTION INTRAVENOUS CONTINUOUS
OUTPATIENT
Start: 2024-07-16

## 2024-07-20 RX ORDER — CIPROFLOXACIN AND DEXAMETHASONE 3; 1 MG/ML; MG/ML
4 SUSPENSION/ DROPS AURICULAR (OTIC) 2 TIMES DAILY
Qty: 7.5 ML | Refills: 0 | Status: SHIPPED | OUTPATIENT
Start: 2024-07-20 | End: 2024-07-30

## 2024-07-22 ENCOUNTER — APPOINTMENT (OUTPATIENT)
Dept: OTOLARYNGOLOGY | Facility: CLINIC | Age: 59
End: 2024-07-22
Payer: COMMERCIAL

## 2024-09-11 NOTE — PREPROCEDURE INSTRUCTIONS
Pre-Op Instructions &?Checklist    Your surgery has been scheduled at [facility] at [address] [place], [address], [address], in the [facility]. Parking is to the left of the main entrance.   You will be contacted about the time of your surgery the day before your surgery (if your surgery is on a Monday you will be called the Friday before surgery). If you are unable to answer the phone, a detailed voicemail message will be left. Make sure that your voicemail box is not full so a message can be left. If you have not received a call by 3:00 pm you may call [phone] between the hours of 3:00 and 4:00 pm. Please be available by phone the night before/day of surgery in case there is a change in the schedule which may require you to arrive earlier/later.   ?   14 DAYS BEFORE SURGERY STOP TAKING WEIGHT LOSS MEDICATIONS    ?   7 DAYS BEFORE SURGERY STOP THESE MEDICATIONS:   Multiple Vitamins containing Vitamin E   Herbal supplements, Fish Oil, garlic pills, turmeric, CoQ enzyme   Stop taking aspirin, aspirin-containing products, and NSAID's like Advil, Motrin, Aleve, and Ibuprofen. Tylenol is okay to take for pain relief.    If you are currently taking Coumadin/Warfarin, we will have to coordinate that with your PCP &/or the Anticoagulation Clinic.       THE DAY BEFORE SURGERY:   Do not eat any food after midnight the night before surgery.    You are permitted to have no more than 4 ounces of clear liquids such as water, apple juice, plain tea or coffee (no milk or creamer), clear electrolyte-replenishing drinks such as Pedialyte, Gatorade, or Powerade (not yogurt or pulp-containing smoothies or juices such as orange juice) up to 3 hours before your arrival time.         DAY OF SURGERY TAKE THESE MEDICATIONS (if it is not listed, do not take it.)    Take:  Atorvastatin; hydrochlorothiazide  If taking medications in tablet/capsule form take with a small sip of water.      ON THE  MORNING OF SURGERY:   *Shower either the night before your surgery or the morning of your surgery   *Do not use moisturizers, creams, lotions or perfume, or make-up.   *Wear comfortable, loose fitting clothing.    *All jewelry and valuables should be left at home.   *Prosthetic devices such as contact lenses, hearing aids, dentures, eyelash extensions, hairpins and body piercings must be removed before surgery. Bring containers for eyeglasses/contacts, dentures, or hearing aids with you.   ?   Diabetics: Please check fasting blood sugars upon waking up. ?If fasting blood sugars are <80ml/dl, please drink 100ml/3oz. of apple juice no later than 2 hours prior to surgery.   ?   ?   BRING WITH YOU:    *Photo ID and insurance card   *Current list of medicines and allergies   *Pacemaker/Defibrillator/Heart stent cards   *Copy of your complete Advanced Directive/DHPOA-if applicable   ?   SMOKING:   *Quitting smoking can make a huge difference to your health and recovery from surgery. ?   *If you need help with quitting, call 2-032-QUIT-NOW.   Alcohol:   *No alcoholic beverages for 48 hours before surgery.   ?   AFTER OUTPATIENT SURGERY:   *A responsible adult MUST accompany you at the time of discharge and stay with you for 24 hours after your surgery.   *You may NOT drive yourself home after surgery.   * You may use a taxi or ride sharing service ([employer], Uber) to return home ONLY if you are accompanied by a friend or family member   *Instructions for resuming your medications will be provided by your surgeon.   ?   CONTACT SURGEON'S OFFICE IF YOU DEVELOP:   * Fever =/>?100.4 F    * New respiratory symptoms (e.g. cough, shortness of breath, respiratory distress, sore throat)   * Recent loss of taste or smell   *Flu like symptoms such as headache, fatigue or gastrointestinal symptoms   * If you develop any open sores, shingles, burning or painful urination    AND/OR:   * You no longer wish to have the surgery.   * Any other  personal circumstances change that may lead to the need to cancel or defer this surgery.   *You were admitted to any hospital within one week of your planned procedure.   ?   If you have any questions regarding these preoperative instructions you may call [phone]. If you have questions regarding you surgical procedure, or post-operative care/recovery please call your surgeon's office.      Link to [facility] Laboratory Services Locations   [email or web address]         Link to [facility] Materialise   [email or web address]\

## 2024-09-11 NOTE — CPM/PAT H&P
CPM/PAT Evaluation       Name: Prakash Villalta II   /Age: 1965       TELEMEDICINE ENCOUNTER  Patient was interviewed by telephone for preadmission testing perioperative risk assessment prior to surgery.    DATE OF CONSULT: 2024  REFERRING PROVIDER: Dr. Eitan Houser  SURGERY, DATE, AND LENGTH: Right ear tympanoplasty with antrotomy, possible ossicular chain reconstruction, cartilage graft, and removal of cholesteatoma; 10/03/2024; 240 minutes    CHIEF COMPLAINT  Cholesteatoma right ear, discontinuity of ossicles, right hearing loss    HPI  Prakash Villalta II is a 59-year-old male with history of bilateral adhesive middle ear disease and multiple PE tube placements.  He has bilateral mixed hearing loss and wears hearing aids.  He had a CT of temporal bone on 2024 showing within the right ear formation of cholesteatoma of the meso and retrotympanic with possible erosion of the incus.     ACTIVE PROBLEMS  Patient Active Problem List   Diagnosis    Acute otitis externa of left ear    Adenoidal hypertrophy    Excessive cerumen in ear canal    Left chronic serous otitis media    Mixed hearing loss, bilateral    Sensorineural hearing loss, asymmetrical    Tinnitus, bilateral    Cholesteatoma of attic of right ear    Adhesive middle ear disease of both sides    Discontinuity of ossicles of both ears    Bilateral impacted cerumen        PAST MEDICAL HISTORY  Past Medical History:   Diagnosis Date    Personal history of other diseases of the circulatory system     History of hypertension        SURGICAL HISTORY  Past Surgical History:   Procedure Laterality Date    ADENOIDECTOMY  2016    Adenoidectomy    COLONOSCOPY      NASAL SEPTUM SURGERY  2016    Nasal Septal Deviation Repair    TYMPANOSTOMY TUBE PLACEMENT  2016    Ear Pressure Equalization Tube        ANESTHESIA HISTORY  Denies problems with anesthesia in the past such as PONV, prolonged sedation, awareness, dental damage, aspiration,  cardiac arrest, difficult intubation, or unexpected hospital admissions. Denies family history of malignant hyperthermia, or pseudocholinesterase deficiency.     SOCIAL HISTORY  Never smoker; no alcohol use; no recreational drug use.  Patient states he goes for walks regularly, and is able to do moderate ADLs such as heavy housework, yard work.  He uses a push mower to mow his 1 acre yard.  He denies chest pain, GRANT.  METS 4    FAMILY HISTORY  No family history on file.     ALLERGIES  No Known Allergies     MEDICATIONS  No current facility-administered medications for this encounter.    Current Outpatient Medications:     atorvastatin (Lipitor) 20 mg tablet, Take 1 tablet (20 mg) by mouth once daily., Disp: , Rfl:     hydroCHLOROthiazide (HYDRODiuril) 25 mg tablet, Take 1 tablet (25 mg) by mouth once daily., Disp: , Rfl:     irbesartan (Avapro) 300 mg tablet, Take 1 tablet (300 mg) by mouth once daily., Disp: , Rfl:     ciprofloxacin-dexamethasone (CiproDEX) otic suspension, 3 drops twice a day to the left ear for 10 days then at night until seen back in the office., Disp: 7.5 mL, Rfl: 1     REVIEW OF SYSTEMS  Review of Systems   HENT:  Positive for hearing loss.    All other systems reviewed and are negative.    STOP BANG:  Obstructive Sleep Apnea (ELROY): Intermediate Risk  Total Score: 3              Patient has high blood pressure or is being treated for high blood pressure     Patient is over 50 years old     Patient is male    Criteria that do not apply:    Patient snores loudly    Patient is often tired    Patient has been observed to stop breathing during sleep    Patient BMI is greater than 35 kg/m2    Patient's neck circumference is greater than 17 inches (male) or 16 inches (female)            PHYSICAL EXAM  Deferred    AIRWAY EXAM  Deferred    VITALS  No vitals taken for telemedicine visit  BMI Readings from Last 1 Encounters:   04/24/24 30.26 kg/m²      BP Readings from Last 4 Encounters:   04/13/23  "121/77   03/11/21 121/85        LABS  No results found for: \"WBC\", \"HGB\", \"HCT\", \"MCV\", \"PLT\"   No results found for: \"GLUCOSE\", \"CALCIUM\", \"NA\", \"K\", \"CO2\", \"CL\", \"BUN\", \"CREATININE\"   No results found for: \"HGBA1C\"   No results found for: \"CHOL\"  No results found for: \"HDL\"  No results found for: \"LDLCALC\"  No results found for: \"TRIG\"  No components found for: \"CHOLHDL\"   Lab orders placed for CBC, CMP on 07/15/2024 by Dr. Houser.  Patient states he is getting his EKG and lab work done on 09/13/2024    IMAGING  Order for EKG placed on 07/15/2024.    ASSESSMENT/PLAN  Adhesive middle ear disease right ear, cholesteatoma, hearing loss  Right ear tympanoplasty with antrotomy, possible ossicular chain reconstruction with cartilage graft, and removal of cholesteatoma      This note was created in part upon personal review of patient's medical records.  Speech recognition transcription software was used in the creation of this note. Despite proofreading, several typographical errors might be present that might affect the meaning of the content.       "

## 2024-09-12 ENCOUNTER — TELEPHONE (OUTPATIENT)
Dept: OTOLARYNGOLOGY | Facility: HOSPITAL | Age: 59
End: 2024-09-12
Payer: COMMERCIAL

## 2024-09-12 NOTE — TELEPHONE ENCOUNTER
Pre-op clearance faxed to patient's PCP family practice to request clearance prior to surgery date of 10/3. Contact information provided for document to be returned.

## 2024-09-30 ENCOUNTER — TELEPHONE (OUTPATIENT)
Dept: OTOLARYNGOLOGY | Facility: CLINIC | Age: 59
End: 2024-09-30
Payer: COMMERCIAL

## 2024-09-30 NOTE — TELEPHONE ENCOUNTER
Spoke with Chriss at [facility] with Dr. Morales's office. Request made for labs and EKG to be forwarded as soon as possible, as patient's surgery is scheduled on 10/3. Chriss states that the office's fax machine is being serviced and will send as soon as possible. Provided Chriss with ENT email address as a work around.

## 2024-09-30 NOTE — TELEPHONE ENCOUNTER
Spoke with patient regarding outstanding lab/EKG testing prior to surgery on 10/3. Provided states that he has requested that the results for labs and EKG be faxed to Dr. Houser' office. Dr. Landon Morales's office contacted to make another request for results to be faxed to the office.

## 2024-10-02 ENCOUNTER — ANESTHESIA EVENT (OUTPATIENT)
Dept: OPERATING ROOM | Facility: CLINIC | Age: 59
End: 2024-10-02
Payer: COMMERCIAL

## 2024-10-03 ENCOUNTER — HOSPITAL ENCOUNTER (OUTPATIENT)
Facility: CLINIC | Age: 59
Setting detail: OUTPATIENT SURGERY
Discharge: HOME | End: 2024-10-03
Attending: OTOLARYNGOLOGY | Admitting: OTOLARYNGOLOGY
Payer: COMMERCIAL

## 2024-10-03 ENCOUNTER — ANESTHESIA (OUTPATIENT)
Dept: OPERATING ROOM | Facility: CLINIC | Age: 59
End: 2024-10-03
Payer: COMMERCIAL

## 2024-10-03 VITALS
TEMPERATURE: 97.5 F | OXYGEN SATURATION: 95 % | SYSTOLIC BLOOD PRESSURE: 133 MMHG | BODY MASS INDEX: 30.91 KG/M2 | HEART RATE: 77 BPM | RESPIRATION RATE: 16 BRPM | DIASTOLIC BLOOD PRESSURE: 72 MMHG | WEIGHT: 228.18 LBS | HEIGHT: 72 IN

## 2024-10-03 DIAGNOSIS — H71.01 CHOLESTEATOMA OF ATTIC OF RIGHT EAR: Primary | ICD-10-CM

## 2024-10-03 DIAGNOSIS — H74.23 DISCONTINUITY OF OSSICLES OF BOTH EARS: ICD-10-CM

## 2024-10-03 DIAGNOSIS — H90.6 MIXED HEARING LOSS, BILATERAL: ICD-10-CM

## 2024-10-03 DIAGNOSIS — L92.9 GRANULATION TISSUE OF EAR CANAL: ICD-10-CM

## 2024-10-03 DIAGNOSIS — H74.13 ADHESIVE MIDDLE EAR DISEASE OF BOTH SIDES: ICD-10-CM

## 2024-10-03 DIAGNOSIS — H73.899 ATTIC RETRACTION POCKET: ICD-10-CM

## 2024-10-03 PROCEDURE — 7100000009 HC PHASE TWO TIME - INITIAL BASE CHARGE: Performed by: OTOLARYNGOLOGY

## 2024-10-03 PROCEDURE — 2500000004 HC RX 250 GENERAL PHARMACY W/ HCPCS (ALT 636 FOR OP/ED)

## 2024-10-03 PROCEDURE — 2500000004 HC RX 250 GENERAL PHARMACY W/ HCPCS (ALT 636 FOR OP/ED): Performed by: OTOLARYNGOLOGY

## 2024-10-03 PROCEDURE — 2500000001 HC RX 250 WO HCPCS SELF ADMINISTERED DRUGS (ALT 637 FOR MEDICARE OP): Performed by: ANESTHESIOLOGY

## 2024-10-03 PROCEDURE — 15760 COMPOSITE SKIN GRAFT: CPT | Performed by: OTOLARYNGOLOGY

## 2024-10-03 PROCEDURE — 2500000002 HC RX 250 W HCPCS SELF ADMINISTERED DRUGS (ALT 637 FOR MEDICARE OP, ALT 636 FOR OP/ED): Performed by: OTOLARYNGOLOGY

## 2024-10-03 PROCEDURE — 95867 NDL EMG CRANIAL NRV MUSC UNI: CPT | Performed by: OTOLARYNGOLOGY

## 2024-10-03 PROCEDURE — 3700000001 HC GENERAL ANESTHESIA TIME - INITIAL BASE CHARGE: Performed by: OTOLARYNGOLOGY

## 2024-10-03 PROCEDURE — 2720000007 HC OR 272 NO HCPCS: Performed by: OTOLARYNGOLOGY

## 2024-10-03 PROCEDURE — 2500000005 HC RX 250 GENERAL PHARMACY W/O HCPCS: Performed by: OTOLARYNGOLOGY

## 2024-10-03 PROCEDURE — 2780000003 HC OR 278 NO HCPCS: Performed by: OTOLARYNGOLOGY

## 2024-10-03 PROCEDURE — 7100000001 HC RECOVERY ROOM TIME - INITIAL BASE CHARGE: Performed by: OTOLARYNGOLOGY

## 2024-10-03 PROCEDURE — 3700000002 HC GENERAL ANESTHESIA TIME - EACH INCREMENTAL 1 MINUTE: Performed by: OTOLARYNGOLOGY

## 2024-10-03 PROCEDURE — 7100000010 HC PHASE TWO TIME - EACH INCREMENTAL 1 MINUTE: Performed by: OTOLARYNGOLOGY

## 2024-10-03 PROCEDURE — 2500000004 HC RX 250 GENERAL PHARMACY W/ HCPCS (ALT 636 FOR OP/ED): Performed by: ANESTHESIOLOGY

## 2024-10-03 PROCEDURE — 2500000001 HC RX 250 WO HCPCS SELF ADMINISTERED DRUGS (ALT 637 FOR MEDICARE OP): Performed by: OTOLARYNGOLOGY

## 2024-10-03 PROCEDURE — 2500000001 HC RX 250 WO HCPCS SELF ADMINISTERED DRUGS (ALT 637 FOR MEDICARE OP)

## 2024-10-03 PROCEDURE — 69637 REBUILD EARDRUM STRUCTURES: CPT | Performed by: OTOLARYNGOLOGY

## 2024-10-03 PROCEDURE — L8613 OSSICULAR IMPLANT: HCPCS | Performed by: OTOLARYNGOLOGY

## 2024-10-03 PROCEDURE — 3600000003 HC OR TIME - INITIAL BASE CHARGE - PROCEDURE LEVEL THREE: Performed by: OTOLARYNGOLOGY

## 2024-10-03 PROCEDURE — 7100000002 HC RECOVERY ROOM TIME - EACH INCREMENTAL 1 MINUTE: Performed by: OTOLARYNGOLOGY

## 2024-10-03 PROCEDURE — 3600000008 HC OR TIME - EACH INCREMENTAL 1 MINUTE - PROCEDURE LEVEL THREE: Performed by: OTOLARYNGOLOGY

## 2024-10-03 PROCEDURE — 2500000005 HC RX 250 GENERAL PHARMACY W/O HCPCS

## 2024-10-03 DEVICE — PRECISE CENTERED PARTIAL 2.00MM LENGTH TITANIUM
Type: IMPLANTABLE DEVICE | Site: EAR | Status: FUNCTIONAL
Brand: PRECISE CENTERED PARTIAL

## 2024-10-03 RX ORDER — TRAMADOL HYDROCHLORIDE 50 MG/1
50 TABLET ORAL EVERY 4 HOURS PRN
Qty: 12 TABLET | Refills: 0 | Status: SHIPPED | OUTPATIENT
Start: 2024-10-03

## 2024-10-03 RX ORDER — ONDANSETRON HYDROCHLORIDE 2 MG/ML
4 INJECTION, SOLUTION INTRAVENOUS ONCE AS NEEDED
Status: DISCONTINUED | OUTPATIENT
Start: 2024-10-03 | End: 2024-10-03 | Stop reason: HOSPADM

## 2024-10-03 RX ORDER — ASPIRIN 81 MG
100 TABLET, DELAYED RELEASE (ENTERIC COATED) ORAL 2 TIMES DAILY
Start: 2024-10-03

## 2024-10-03 RX ORDER — CEFAZOLIN SODIUM 2 G/100ML
2 INJECTION, SOLUTION INTRAVENOUS ONCE
Status: DISCONTINUED | OUTPATIENT
Start: 2024-10-03 | End: 2024-10-03 | Stop reason: HOSPADM

## 2024-10-03 RX ORDER — SODIUM CHLORIDE 0.9 G/100ML
IRRIGANT IRRIGATION AS NEEDED
Status: DISCONTINUED | OUTPATIENT
Start: 2024-10-03 | End: 2024-10-03 | Stop reason: HOSPADM

## 2024-10-03 RX ORDER — OXYCODONE HYDROCHLORIDE 5 MG/1
5 TABLET ORAL EVERY 4 HOURS PRN
Status: DISCONTINUED | OUTPATIENT
Start: 2024-10-03 | End: 2024-10-03 | Stop reason: HOSPADM

## 2024-10-03 RX ORDER — SODIUM CHLORIDE, SODIUM LACTATE, POTASSIUM CHLORIDE, CALCIUM CHLORIDE 600; 310; 30; 20 MG/100ML; MG/100ML; MG/100ML; MG/100ML
100 INJECTION, SOLUTION INTRAVENOUS CONTINUOUS
Status: DISCONTINUED | OUTPATIENT
Start: 2024-10-03 | End: 2024-10-03 | Stop reason: HOSPADM

## 2024-10-03 RX ORDER — ONDANSETRON HYDROCHLORIDE 2 MG/ML
INJECTION, SOLUTION INTRAVENOUS AS NEEDED
Status: DISCONTINUED | OUTPATIENT
Start: 2024-10-03 | End: 2024-10-03

## 2024-10-03 RX ORDER — PHENYLEPHRINE HCL IN 0.9% NACL 0.4MG/10ML
SYRINGE (ML) INTRAVENOUS AS NEEDED
Status: DISCONTINUED | OUTPATIENT
Start: 2024-10-03 | End: 2024-10-03

## 2024-10-03 RX ORDER — CIPROFLOXACIN AND DEXAMETHASONE 3; 1 MG/ML; MG/ML
SUSPENSION/ DROPS AURICULAR (OTIC)
Qty: 7.5 ML | Refills: 1 | Status: SHIPPED | OUTPATIENT
Start: 2024-10-03

## 2024-10-03 RX ORDER — LIDOCAINE HYDROCHLORIDE 20 MG/ML
INJECTION, SOLUTION INFILTRATION; PERINEURAL AS NEEDED
Status: DISCONTINUED | OUTPATIENT
Start: 2024-10-03 | End: 2024-10-03

## 2024-10-03 RX ORDER — CEPHALEXIN 500 MG/1
500 CAPSULE ORAL 3 TIMES DAILY
Qty: 15 CAPSULE | Refills: 0 | Status: SHIPPED | OUTPATIENT
Start: 2024-10-03 | End: 2024-10-08

## 2024-10-03 RX ORDER — ALBUTEROL SULFATE 0.83 MG/ML
2.5 SOLUTION RESPIRATORY (INHALATION) ONCE AS NEEDED
Status: DISCONTINUED | OUTPATIENT
Start: 2024-10-03 | End: 2024-10-03 | Stop reason: HOSPADM

## 2024-10-03 RX ORDER — DEXAMETHASONE SODIUM PHOSPHATE 10 MG/ML
INJECTION INTRAMUSCULAR; INTRAVENOUS AS NEEDED
Status: DISCONTINUED | OUTPATIENT
Start: 2024-10-03 | End: 2024-10-03

## 2024-10-03 RX ORDER — IBUPROFEN 600 MG/1
600 TABLET ORAL EVERY 6 HOURS PRN
Start: 2024-10-03

## 2024-10-03 RX ORDER — FENTANYL CITRATE 50 UG/ML
INJECTION, SOLUTION INTRAMUSCULAR; INTRAVENOUS AS NEEDED
Status: DISCONTINUED | OUTPATIENT
Start: 2024-10-03 | End: 2024-10-03

## 2024-10-03 RX ORDER — ISOPROPYL ALCOHOL 70 ML/100ML
LIQUID TOPICAL AS NEEDED
Status: DISCONTINUED | OUTPATIENT
Start: 2024-10-03 | End: 2024-10-03 | Stop reason: HOSPADM

## 2024-10-03 RX ORDER — ROCURONIUM BROMIDE 10 MG/ML
INJECTION, SOLUTION INTRAVENOUS AS NEEDED
Status: DISCONTINUED | OUTPATIENT
Start: 2024-10-03 | End: 2024-10-03

## 2024-10-03 RX ORDER — CIPROFLOXACIN AND DEXAMETHASONE 3; 1 MG/ML; MG/ML
SUSPENSION/ DROPS AURICULAR (OTIC) AS NEEDED
Status: DISCONTINUED | OUTPATIENT
Start: 2024-10-03 | End: 2024-10-03 | Stop reason: HOSPADM

## 2024-10-03 RX ORDER — LIDOCAINE HYDROCHLORIDE 20 MG/ML
INJECTION, SOLUTION INFILTRATION; PERINEURAL AS NEEDED
Status: DISCONTINUED | OUTPATIENT
Start: 2024-10-03 | End: 2024-10-03 | Stop reason: HOSPADM

## 2024-10-03 RX ORDER — EPINEPHRINE 1 MG/ML
INJECTION, SOLUTION, CONCENTRATE INTRAVENOUS AS NEEDED
Status: DISCONTINUED | OUTPATIENT
Start: 2024-10-03 | End: 2024-10-03 | Stop reason: HOSPADM

## 2024-10-03 RX ORDER — LIDOCAINE HYDROCHLORIDE AND EPINEPHRINE 20; 10 MG/ML; UG/ML
INJECTION, SOLUTION INFILTRATION; PERINEURAL AS NEEDED
Status: DISCONTINUED | OUTPATIENT
Start: 2024-10-03 | End: 2024-10-03 | Stop reason: HOSPADM

## 2024-10-03 RX ORDER — MUPIROCIN 20 MG/G
OINTMENT TOPICAL AS NEEDED
Status: DISCONTINUED | OUTPATIENT
Start: 2024-10-03 | End: 2024-10-03 | Stop reason: HOSPADM

## 2024-10-03 RX ORDER — ACETAMINOPHEN 325 MG/1
650 TABLET ORAL EVERY 6 HOURS PRN
Start: 2024-10-03

## 2024-10-03 RX ORDER — ONDANSETRON 4 MG/1
4 TABLET, FILM COATED ORAL EVERY 8 HOURS PRN
Qty: 20 TABLET | Refills: 0 | Status: SHIPPED | OUTPATIENT
Start: 2024-10-03

## 2024-10-03 RX ORDER — FENTANYL CITRATE 50 UG/ML
25 INJECTION, SOLUTION INTRAMUSCULAR; INTRAVENOUS EVERY 5 MIN PRN
Status: DISCONTINUED | OUTPATIENT
Start: 2024-10-03 | End: 2024-10-03 | Stop reason: HOSPADM

## 2024-10-03 RX ORDER — ACETAMINOPHEN 325 MG/1
650 TABLET ORAL EVERY 4 HOURS PRN
Status: DISCONTINUED | OUTPATIENT
Start: 2024-10-03 | End: 2024-10-03 | Stop reason: HOSPADM

## 2024-10-03 RX ORDER — MIDAZOLAM HYDROCHLORIDE 1 MG/ML
INJECTION, SOLUTION INTRAMUSCULAR; INTRAVENOUS AS NEEDED
Status: DISCONTINUED | OUTPATIENT
Start: 2024-10-03 | End: 2024-10-03

## 2024-10-03 RX ORDER — FENTANYL CITRATE 50 UG/ML
50 INJECTION, SOLUTION INTRAMUSCULAR; INTRAVENOUS EVERY 5 MIN PRN
Status: DISCONTINUED | OUTPATIENT
Start: 2024-10-03 | End: 2024-10-03 | Stop reason: HOSPADM

## 2024-10-03 RX ORDER — SODIUM CHLORIDE 9 MG/ML
100 INJECTION, SOLUTION INTRAVENOUS CONTINUOUS
Status: DISCONTINUED | OUTPATIENT
Start: 2024-10-03 | End: 2024-10-03 | Stop reason: HOSPADM

## 2024-10-03 RX ORDER — PROPOFOL 10 MG/ML
INJECTION, EMULSION INTRAVENOUS AS NEEDED
Status: DISCONTINUED | OUTPATIENT
Start: 2024-10-03 | End: 2024-10-03

## 2024-10-03 RX ORDER — OXYCODONE HYDROCHLORIDE 10 MG/1
10 TABLET ORAL EVERY 4 HOURS PRN
Status: DISCONTINUED | OUTPATIENT
Start: 2024-10-03 | End: 2024-10-03 | Stop reason: HOSPADM

## 2024-10-03 RX ORDER — CEFAZOLIN 1 G/1
INJECTION, POWDER, FOR SOLUTION INTRAVENOUS AS NEEDED
Status: DISCONTINUED | OUTPATIENT
Start: 2024-10-03 | End: 2024-10-03

## 2024-10-03 RX ORDER — SODIUM CHLORIDE, SODIUM LACTATE, POTASSIUM CHLORIDE, CALCIUM CHLORIDE 600; 310; 30; 20 MG/100ML; MG/100ML; MG/100ML; MG/100ML
20 INJECTION, SOLUTION INTRAVENOUS CONTINUOUS
Status: DISCONTINUED | OUTPATIENT
Start: 2024-10-03 | End: 2024-10-03 | Stop reason: HOSPADM

## 2024-10-03 RX ORDER — LIDOCAINE HYDROCHLORIDE 40 MG/ML
SOLUTION TOPICAL AS NEEDED
Status: DISCONTINUED | OUTPATIENT
Start: 2024-10-03 | End: 2024-10-03

## 2024-10-03 RX ORDER — DIPHENHYDRAMINE HYDROCHLORIDE 50 MG/ML
12.5 INJECTION INTRAMUSCULAR; INTRAVENOUS ONCE AS NEEDED
Status: DISCONTINUED | OUTPATIENT
Start: 2024-10-03 | End: 2024-10-03 | Stop reason: HOSPADM

## 2024-10-03 RX ADMIN — SODIUM CHLORIDE, POTASSIUM CHLORIDE, SODIUM LACTATE AND CALCIUM CHLORIDE 20 ML/HR: 600; 310; 30; 20 INJECTION, SOLUTION INTRAVENOUS at 07:25

## 2024-10-03 RX ADMIN — ACETAMINOPHEN 650 MG: 325 TABLET, FILM COATED ORAL at 13:36

## 2024-10-03 SDOH — HEALTH STABILITY: MENTAL HEALTH: CURRENT SMOKER: 0

## 2024-10-03 ASSESSMENT — PAIN SCALES - GENERAL
PAINLEVEL_OUTOF10: 0 - NO PAIN
PAINLEVEL_OUTOF10: 3

## 2024-10-03 ASSESSMENT — PAIN - FUNCTIONAL ASSESSMENT
PAIN_FUNCTIONAL_ASSESSMENT: 0-10

## 2024-10-03 ASSESSMENT — COLUMBIA-SUICIDE SEVERITY RATING SCALE - C-SSRS
2. HAVE YOU ACTUALLY HAD ANY THOUGHTS OF KILLING YOURSELF?: NO
1. IN THE PAST MONTH, HAVE YOU WISHED YOU WERE DEAD OR WISHED YOU COULD GO TO SLEEP AND NOT WAKE UP?: NO
6. HAVE YOU EVER DONE ANYTHING, STARTED TO DO ANYTHING, OR PREPARED TO DO ANYTHING TO END YOUR LIFE?: NO

## 2024-10-03 ASSESSMENT — PAIN DESCRIPTION - LOCATION: LOCATION: EAR

## 2024-10-03 ASSESSMENT — PAIN DESCRIPTION - ORIENTATION: ORIENTATION: RIGHT

## 2024-10-03 NOTE — ANESTHESIA PROCEDURE NOTES
Airway  Date/Time: 10/3/2024 7:50 AM  Urgency: elective    Airway not difficult    Staffing  Performed: CRNA   Authorized by: Yanelis Arora DO    Performed by: JAMEE Oreilly-CRNA, BEATA  Patient location during procedure: OR    Indications and Patient Condition  Indications for airway management: anesthesia and airway protection  Spontaneous Ventilation: absent  Sedation level: deep  Preoxygenated: yes  Patient position: sniffing  Mask difficulty assessment: 1 - vent by mask  Planned trial extubation    Final Airway Details  Final airway type: endotracheal airway      Successful airway: ETT  Cuffed: yes   Successful intubation technique: direct laryngoscopy  Facilitating devices/methods: intubating stylet  Endotracheal tube insertion site: oral  Blade: Marion  Blade size: #4  ETT size (mm): 7.5  Cormack-Lehane Classification: grade I - full view of glottis  Placement verified by: chest auscultation and capnometry   Cuff volume (mL): 7  Measured from: lips  ETT to lips (cm): 23  Number of attempts at approach: 1    Additional Comments  LTA kit

## 2024-10-03 NOTE — H&P
"Otolaryngology - Head and Neck Surgery History and Physical    History Of Present Illness  Prakash Villalta II \"Pierre\" is a 59 y.o. 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 Since the last clinic visit, the patient has had no major changes in symptoms.     Past Medical History  He has a past medical history of Personal history of other diseases of the circulatory system.    Surgical History  He has a past surgical history that includes Tympanostomy tube placement (08/11/2016); Nasal septum surgery (08/11/2016); Adenoidectomy (08/11/2016); and Colonoscopy.     Social History  He reports that he has never smoked. He has never used smokeless tobacco. He reports that he does not currently use alcohol. He reports that he does not use drugs.    Family History  No family history on file.     Allergies  Patient has no known allergies.    Review of Systems:  A 12-point review of systems was performed and noted be negative except for that which was mentioned in the history of present illness     Last Recorded Vitals  Blood pressure 156/76, pulse 67, temperature 36.3 °C (97.3 °F), temperature source Temporal, resp. rate 16, height 1.816 m (5' 11.5\"), weight 104 kg (228 lb 2.8 oz), SpO2 98%.     Physical Exam:  Constitutional:  No acute distress  Voice:  No hoarseness or other abnormality  Respiration:  Breathing comfortably, no stridor  Eyes:  EOM intact grossly, sclera normal  Neuro:  Alert and oriented times 3, Cranial nerves II-XII grossly intact and symmetric bilaterally  Head and Face:  Symmetric facial features, no masses or lesions  Ears:  Normal external ear bilaterally, hearing adequate for verbal communication  Nose:  External nose normal in appearance, nares patent and without drainage  Oral Cavity/Oropharynx/Lips:  Normal mucous membranes, no masses or lesions  Neck/Lymph:  No lesion or masses, trachea " "midline    Medications:  Scheduled medications  ceFAZolin, 2 g, intravenous, Once      Continuous medications  lactated Ringer's, 20 mL/hr  sodium chloride 0.9%, 100 mL/hr      PRN medications      Recent Labs:  No results found for this or any previous visit (from the past 24 hour(s)).    Imaging:  Preoperative audiogram and imaging were reviewed       Assessment/Plan   Prakash Villalta II \"Pierre\" is a 59 y.o. male presenting with bilateral adhesive middle ear disease and forming right cholesteatoma. The risks, indications, alternatives and complications of the proposed procedure were discussed with the patient. These include but are not limited to facial nerve injury, deafness in the operated ear, vertigo, dizziness, imbalance, facial weakness or paralysis, change in sense of taste, perforation of eardrum, pain, bleeding, infection, scarring, need for further surgery, recurrence, prosthesis extrusion. The patient expressed understanding and all questions were answered. Consent was signed and saved to the patient chart.    Proceed with right tympanoplasty and antrotomy, cartilage graft, possible ossiculoplasty.    Darryl Black MD  Neurotology Fellow  Otolaryngology-Head & Neck Surgery    "

## 2024-10-03 NOTE — OP NOTE
OPERATIVE NOTE     Date:  10/3/2024 OR Location: [facility] [place] [facility] OR    Name: Prakash Villalta II : 1965, Age: 59 y.o., MRN: [id number], Sex: male      Surgeons      Eitan Gill MD    Resident/Fellow/Other Assistant:  Darryl Black MD    Anesthesia: General  ASA: I  Anesthesia Staff: Anesthesiologist: Yanelis Arora DO  CRNA: JAMEE Oreilly-CRNA, BEATA  Staff: Circulator: Julieta Lutz RN  Scrub Person: Jeaneth Vela          Preoperative Diagnosis:  Adhesive middle ear disease   - Bilateral  2. Ossicular chain discontinuity.   - Bilateral  3. Forming Mesotympanic Cholesteatoma   - Right  4. Mixed hearing loss   - Restricted on the contralateral side   - Right      Postoperative Diagnosis:  Adhesive middle ear disease   - Bilateral  2. Ossicular chain discontinuity.   - Bilateral  3. Forming Mesotympanic Cholesteatoma   - Right  4. Mixed hearing loss   - Restricted on the contralateral side   - Right      Procedure Performed:  1.  Tympanoplasty with antrotomy, and ossicular chain reconstruction.  - (TEES Tympanoplasty with antrotomy, ossicular chain reconstruction and composite cartilage graft)  - Right  2. Composite cartilage / perichondrium graft    -   Right  3. Needle electromyography; cranial nerve supplied muscle(s), unilateral   - Facial nerve.   - Right      Indications:  Prakash Villalta II is a very pleasant 59 y.o. male who presents with a history of adhesive middle ear disease and ossicular discontinuity bilaterally. Now he has early formation of a cholesteatoma in the affected ear, and associated mixed hearing loss. We plan to do an endoscopic tympanoplasty, antrotomy, composite cartilage graft and possible ossicular chain reconstruction. The risks, indications, alternatives and complications of surgery were discussed including, but not limited to facial nerve injury, deafness in the operated ear, vertigo, dizziness, imbalance, facial weakness or paralysis, change in sense of  taste, perforation of eardrum, pain, bleeding, infection, scarring, need for further surgery, recurrence, prosthesis extrusion. Informed consent was obtained and the patient and family elected to proceed. Because of the extensive nature of the dissection and the close proximity of the facial nerve, facial nerve monitoring was used throughout the case.       Operative Findings:  1. Adhesive middle ear disease eroding a portion of the scutum, atelectatic drum and severe retraction over the lateral epitympanum (prusak space), posterior mesotympanum, and retrotympanum including sinus tympani and subtympanic sinus. It also covered the eroded long process of the incus as well as the stapes capitulum, and chorda tympani nerve.  2. Cholesteatoma location: posterior mesotympanum and retrotympanum including sinus tympani and subtympanic sinus.  3. Ossicular chain:   - Malleus: present. Tensor tympani tendon was cut.  - Incus:  Present; long process eroded and incus was removed .  - Stapes: Suprastructure present but very mobile. Footplate mobile.  4. Chorda present and preserved.   5. Moderate middle ear synechiae resected.  6. Middle ear mucosa moderately inflamed.   7. OCR: [employer] Precise PORP 2.0mm placed.  8. Facial nerve: Not dehiscent in tympanic segment.  9. Eustachian tube: Open.      Operative Technique:   The patient was identified in the holding area and then brought to the operating room and placed supine on the operating table. After successful induction of general endotracheal anesthesia via endotracheal tube intubation, facial nerve bipolar electrodes were placed on the patient's orbicularis oris and oculi muscles and monitored the facial nerve throughout the course of the case. The patient was then prepped and draped in normal sterile fashion. A small amount of hair was shaved and the patient had nearly 4 cc of 2%lidocaine with 1:100.000 epinephrine injected into their postauricular sulcus and tragus.  The ear was prepped and draped in the normal fashion.      Standard 4 quadrant canal injections were performed. The ear canal was irrigated. Using a 0-degree endoscope throughout the case, a standard stapes tympanomeatal incision was made. The tympanomeatal flap was raised down to the middle ear inferiorly, just anterior to the deep retraction pocket, and the inferior eardrum was reflected anteriorly. The atelectatic drum and cholesteatoma were found to be extending to the posterior mesotympanum and retrotympanum including sinus tympani and subtympanic sinus. An antrotomy was performed to dissect the cholesteatoma fully from the retrotympanum and lateral epitympanum. The atelectasis was elevated from the sinus tympani, subtympanic sinus, up to the pyramidal process.  The drum was removed off the chorda tympani and the eroded incus long process and stapes capitulum, leaving the stapes suprastructure intact and mobile.  This was followed by removing the drum off of the lateral epitympanum and off of the malleus lateral process.  The ear drum was then cut to isolate the cholesteatoma. The diseased portion of the drum was resected along with the cholesteatoma. The middle ear mucosa was moderately inflamed. At this point the entire middle ear was inspected once again, and no further disease observed. The tensor tympani tendon was cut to improve space within the middle ear. The incus was removed. The malleus remained in position. The stapes footplate was mobile. The stapes suprastructure was present. The footplate was mobile. Chorda was present and preserved.      A small incision was made in the lateral tragus and a composite cartilage graft with perichondrium was harvested. The wound was irrigated and hemostasis achieved with bipolar cautery. The wound was irrigated and closed with interrupted fast absorbing gut sutures. The cartilage was then thinned and cut to the appropriate size.    Attention was then turned towards  reconstruction. The Eustachian tube and middle ear were packed with Gelfoam soaked in ciprodex. The prosthesis was sized and measured to be 2.0mm in size.  The composite cartilage and perichondrium island graft was then trimmed and placed in a palisading fashion, both deep to the remnant tympanic membrane anteriorly and inferiorly and to reconstruct the scutum/ antrotomy.  A previously cut perichondrial graft was then placed in the normal location of the resected posterior ear drum, under the manubrium, and under the remnant drum edges. The tympanomeatal flap was then returned to its normal location. The reconstruction was then elevated and a 2.0mm [employer] Precise PORP prosthesis 2.0mm was placed from the stapes to the drum. A cartilage cap was placed atop the prosthesis, under the perichondrial graft. The reconstructed drum was then placed back into position. The tympanomeatal flap was placed back into position. It was secured with Gelfoam and Bactroban ointment. The wound was then covered with a cotton ball, and a Band-Aid.    This completed the procedure. The patient was then emerged from anesthesia and extubated without difficulty. The patient was then transported back to PACU. There were no apparent complications. Following the procedure, Dr. Houser discussed the findings with the patient's family and answered all of their questions.     Attending Attestation: I was present and scrubbed for the entire procedure.      Implants:   Implant Name Type Inv. Item Serial No.  Lot No. LRB No. Used Action   PROSTHESIS, EAR, OSSICULAR PARTAL, 0.2MM SD X 2.0MM L, PRECISE CENTERED, TI - [id number] Cochlear Implant PROSTHESIS, EAR, OSSICULAR PARTAL, 0.2MM SD X 2.0MM L, [employer] CENTER[employer], TI  [id number] 1 Implanted     Specimens: No specimens collected  Estimated Blood Loss: Minimal  Complications: none  Condition of the patient: Stable  Disposition:  PACU    ____________________________________________________  Eitan Houser MD  Professor and Chief   Otology/Neurotology/Lateral Skull-Base Surgery   [facility]  Phone: [phone]  Fax: [phone]

## 2024-10-03 NOTE — ANESTHESIA POSTPROCEDURE EVALUATION
"Patient: Prakash Villalta II \"Pierre\"    Procedure Summary       Date: 10/03/24 Room / Location: [facility] [place] [facility] OR 01 / [facility] [place] [facility] OR    Anesthesia Start: 0737 Anesthesia Stop: 1218    Procedure: Tympanoplasty with Antrotomy, ossicular chain reconstruction, cartilage graft, and removal of choleteatoma (Right) Diagnosis:       Cholesteatoma of attic of right ear      Adhesive middle ear disease of both sides      Discontinuity of ossicles of both ears      Mixed hearing loss, bilateral      (Cholesteatoma of attic of right ear [H71.01])      (Adhesive middle ear disease of both sides [H74.13])      (Discontinuity of ossicles of both ears [H74.23])      (Mixed hearing loss, bilateral [H90.6])    Surgeons: Eitan Gill MD Responsible Provider: aYnelis Arora DO    Anesthesia Type: general ASA Status: 1            Anesthesia Type: general    Vitals Value Taken Time   /63 10/03/24 1246   Temp 36.4 °C (97.5 °F) 10/03/24 1215   Pulse 89 10/03/24 1246   Resp 17 10/03/24 1246   SpO2 94 % 10/03/24 1246       Anesthesia Post Evaluation    Patient location during evaluation: PACU  Patient participation: complete - patient participated  Level of consciousness: awake  Pain management: satisfactory to patient  Multimodal analgesia pain management approach  Airway patency: patent  Cardiovascular status: acceptable  Respiratory status: acceptable  Hydration status: acceptable  Postoperative Nausea and Vomiting: none    There were no known notable events for this encounter.    "

## 2024-10-03 NOTE — ANESTHESIA PREPROCEDURE EVALUATION
"Patient: Prakash Villalta II \"Pierre\"    Procedure Information       Date/Time: 10/03/24 0730    Procedure: Tympanoplasty with Antrotomy, possible ossicular chain reconstruction, cartilage graft, and removal of choleteatoma (Right) - OR: 3.5hrs    [place] [facility] [place] [facility] [place] [facility] [place] [facility]    Surgeons: Eitan Gill MD            Relevant Problems   HEENT   (+) Mixed hearing loss, bilateral   (+) Sensorineural hearing loss, asymmetrical       Clinical information reviewed:   Tobacco  Allergies  Meds   Med Hx  Surg Hx   Fam Hx  Soc Hx        NPO Detail:  NPO/Void Status  Date of Last Liquid: 10/03/24  Time of Last Liquid: 0545  Date of Last Solid: 10/02/24  Time of Last Solid: 1900  Time of Last Void: 0645         Physical Exam    Airway  Mallampati: II  TM distance: >3 FB  Neck ROM: full     Cardiovascular   Rhythm: regular  Rate: normal     Dental - normal exam     Pulmonary   Breath sounds clear to auscultation     Abdominal            Anesthesia Plan    History of general anesthesia?: yes  History of complications of general anesthesia?: no    ASA 1     general     The patient is not a current smoker.  Education provided regarding risk of obstructive sleep apnea.  intravenous induction   Trial extubation is planned.  Anesthetic plan and risks discussed with patient.    Plan discussed with attending.      "

## 2024-10-20 NOTE — PROGRESS NOTES
"        Reason for Consult:  Post-op     Subjective   History Of Present Illness:  Prakash Villalta II \"Pierre\" is a 59 y.o. male with bilateral adhesive middle ear disease and multiple PE tubes in the past and current bilateral mixed hearing loss.  The left side is his better hearing ear. He has adhesive middle ear disease with erosion of the long process of the incus and pexy over the stapes providing good conduction of sound.      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     I took the patient to the operating room on 10/03/24 and performed a right tympanoplasty with antrotomy, OCR, cartilage graft, and removal of cholesteatoma.     During surgery we encountered:  1. Adhesive middle ear disease eroding a portion of the scutum, atelectatic drum and severe retraction over the lateral epitympanum (prusak space), posterior mesotympanum, and retrotympanum including sinus tympani and subtympanic sinus. It also covered the eroded long process of the incus as well as the stapes capitulum, and chorda tympani nerve.  2. Cholesteatoma location: posterior mesotympanum and retrotympanum including sinus tympani and subtympanic sinus.  3. Ossicular chain:   - Malleus: present. Tensor tympani tendon was cut.  - Incus:  Present; long process eroded and incus was removed .  - Stapes: Suprastructure present but very mobile. Footplate mobile.  4. Chorda present and preserved.   5. Moderate middle ear synechiae resected.  6. Middle ear mucosa moderately inflamed.   7. OCR: [email or web address] Precise PORP 2.0mm placed.  8. Facial nerve: Not dehiscent in tympanic segment.  9. Eustachian tube: Open.    Since surgery the patient has been doing well and has not had any major problem.     Past Medical History:  He has a past medical history of Personal history of other diseases of the circulatory system.    Surgical History:  He has a past " "surgical history that includes Tympanostomy tube placement (08/11/2016); Nasal septum surgery (08/11/2016); Adenoidectomy (08/11/2016); and Colonoscopy.     Social History:  He reports that he has never smoked. He has never used smokeless tobacco. He reports that he does not currently use alcohol. He reports that he does not use drugs.    Family History:  family history is not on file.     Medications:  Current Outpatient Medications   Medication Instructions    acetaminophen (TYLENOL) 650 mg, oral, Every 6 hours PRN    atorvastatin (LIPITOR) 20 mg, Daily    ciprofloxacin-dexamethasone (CiproDEX) otic suspension 4 drops twice a day to the right ear starting 1 week before your follow up appointment with Dr. Houser    docusate sodium (COLACE) 100 mg, oral, 2 times daily, Take while using narcotics for pain control    hydroCHLOROthiazide (HYDRODIURIL) 25 mg, Daily    ibuprofen 600 mg, oral, Every 6 hours PRN    irbesartan (AVAPRO) 300 mg, Daily    ondansetron (ZOFRAN) 4 mg, oral, Every 8 hours PRN    traMADol (ULTRAM) 50 mg, oral, Every 4 hours PRN      Allergies:  Patient has no known allergies.    Review of Systems:   A comprehensive 10-point review of systems was obtained including constitutional, neurological, HEENT, pulmonary, cardiovascular, genito-urinary, and other pertinent systems and was negative except as noted in the HPI.      Objective   Physical Exam:  Last Recorded Vitals: Temperature 36.1 °C (97 °F), height 1.803 m (5' 11\"), weight 103 kg (228 lb).    On physical exam, the patient is a well-nourished, well-developed patient, in no acute distress, able to communicate without assistance in English language. Head and face is atraumatic and normocephalic. Salivary glands are intact. Facial strength is symmetrical bilaterally.       On ear examination:  Right ear: The patient has packing which was removed. The neotympanum is intact and in good condition.   AC>BC  Left ear: The patient has an open and patent " "ear canal. There is adhesive middle ear disease with erosion of the incus with incudostapedopexy  BC>AC  The Duran is right    On vestibular exam, the patient has no spontaneous nystagmus, no headshake nystagmus, no head-thrust nystagmus, and no nystagmus on hyperventilation or Valsalva maneuvers. Emma-Hallpike maneuver is negative bilaterally.       On neuro exam, the patient is alert and oriented x3, cranial nerves are grossly intact, cerebellar exam is normal.      The rest of the exam, including anterior rhinoscopy, oropharyngeal exam, neck exam, and cardiovascular exam, were normal including no palpable lymphadenopathies, thyroid in the midline position, normal pulses, and normal chest excursion.       Reviewed Results:  Audiology Testing:  I personally reviewed the audiogram from 05/2024 that showed:            Right ear: moderate mixed hearing loss with 10 decibels of airborne gap. Discrimination: 92%            Left ear: moderate mixed hearing loss with 10 decibels of airborne gap. Discrimination: 92%    Imaging:  I personally reviewed the CT of the temporal bone from 06/03/24 that showed:  Right sided cholesteatoma of the meso and retro tympanum with an intact stapes and possible incus erosion. The mastoid is clean. On the left side, he has an atelectatic drum with erosion of the long process of the incus.     Procedure:  None    Assessment/Plan     1. Cholesteatoma of attic of right ear    2. Adhesive middle ear disease of both sides    3. Discontinuity of ossicles of both ears    4. Mixed hearing loss, bilateral        In summary, Prakash Villalta II \"Pierre\" is a 59 y.o. male with bilateral adhesive middle ear disease and multiple PE tubes in the past and current bilateral mixed hearing loss.  The left side is his better hearing ear. He has adhesive middle ear disease with erosion of the long process of the incus and pexy over the stapes providing good conduction of sound.      On the right side, he also has " adhesive middle ear disease with a forming choleteatoma of the meso and retro tympanum and possible erosion of the incus.     I took the patient to the operating room on 10/03/24 and performed a right tympanoplasty with antrotomy, OCR, cartilage graft, and removal of cholesteatoma. The Neotympanum is intact and in good condition.    - Continue drops for 2 more weeks.  - Return to regular activities.  - Continue dry ear precautions.  - Follow up in 3 months with an Audiogram.     Scribe Attestation  By signing my name below, I, Anderson Guzman   attest that this documentation has been prepared under the direction and in the presence of Eitan Gill MD.   ____________________________________________________  Eitan Houser MD  Professor and Chief   Otology/Neurotology/Lateral Skull-Base Surgery   [facility]

## 2024-10-21 ENCOUNTER — APPOINTMENT (OUTPATIENT)
Dept: OTOLARYNGOLOGY | Facility: CLINIC | Age: 59
End: 2024-10-21
Payer: COMMERCIAL

## 2024-10-21 VITALS — TEMPERATURE: 97 F | WEIGHT: 228 LBS | BODY MASS INDEX: 31.92 KG/M2 | HEIGHT: 71 IN

## 2024-10-21 DIAGNOSIS — H71.01 CHOLESTEATOMA OF ATTIC OF RIGHT EAR: Primary | ICD-10-CM

## 2024-10-21 DIAGNOSIS — H74.23 DISCONTINUITY OF OSSICLES OF BOTH EARS: ICD-10-CM

## 2024-10-21 DIAGNOSIS — H90.6 MIXED HEARING LOSS, BILATERAL: ICD-10-CM

## 2024-10-21 DIAGNOSIS — H74.13 ADHESIVE MIDDLE EAR DISEASE OF BOTH SIDES: ICD-10-CM

## 2024-10-21 PROCEDURE — 3008F BODY MASS INDEX DOCD: CPT | Performed by: OTOLARYNGOLOGY

## 2024-10-21 PROCEDURE — 99024 POSTOP FOLLOW-UP VISIT: CPT | Performed by: OTOLARYNGOLOGY

## 2024-10-21 ASSESSMENT — PATIENT HEALTH QUESTIONNAIRE - PHQ9
SUM OF ALL RESPONSES TO PHQ9 QUESTIONS 1 AND 2: 0
1. LITTLE INTEREST OR PLEASURE IN DOING THINGS: NOT AT ALL
2. FEELING DOWN, DEPRESSED OR HOPELESS: NOT AT ALL

## 2024-10-21 NOTE — LETTER
"October 24, 2024     Tye Hayward DMD, MD  [address] [facility] [address]    Patient: Pierre Villalta II   YOB: 1965   Date of Visit: 10/21/2024       Dear Dr. Tye Hayward DMD, MD:    Thank you for referring Pierre Villalta II to me for evaluation. Below are my notes for this consultation.  If you have questions, please do not hesitate to call me. I look forward to following your patient along with you.       Sincerely,     Eitan Gill MD      CC: Landon Morales MD  ______________________________________________________________________________________            Reason for Consult:  Post-op     Subjective  History Of Present Illness:  Prakash Villalta II \"Pierre\" is a 59 y.o. male with bilateral adhesive middle ear disease and multiple PE tubes in the past and current bilateral mixed hearing loss.  The left side is his better hearing ear. He has adhesive middle ear disease with erosion of the long process of the incus and pexy over the stapes providing good conduction of sound.      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     I took the patient to the operating room on 10/03/24 and performed a right tympanoplasty with antrotomy, OCR, cartilage graft, and removal of cholesteatoma.     During surgery we encountered:  1. Adhesive middle ear disease eroding a portion of the scutum, atelectatic drum and severe retraction over the lateral epitympanum (prusak space), posterior mesotympanum, and retrotympanum including sinus tympani and subtympanic sinus. It also covered the eroded long process of the incus as well as the stapes capitulum, and chorda tympani nerve.  2. Cholesteatoma location: posterior mesotympanum and retrotympanum including sinus tympani and subtympanic sinus.  3. Ossicular chain:   - Malleus: present. Tensor tympani tendon was cut.  - Incus:  Present; long process " eroded and incus was removed .  - Stapes: Suprastructure present but very mobile. Footplate mobile.  4. Chorda present and preserved.   5. Moderate middle ear synechiae resected.  6. Middle ear mucosa moderately inflamed.   7. OCR: Padmini medical Precise PORP 2.0mm placed.  8. Facial nerve: Not dehiscent in tympanic segment.  9. Eustachian tube: Open.    Since surgery the patient has been doing well and has not had any major problem.     Past Medical History:  He has a past medical history of Personal history of other diseases of the circulatory system.    Surgical History:  He has a past surgical history that includes Tympanostomy tube placement (08/11/2016); Nasal septum surgery (08/11/2016); Adenoidectomy (08/11/2016); and Colonoscopy.     Social History:  He reports that he has never smoked. He has never used smokeless tobacco. He reports that he does not currently use alcohol. He reports that he does not use drugs.    Family History:  family history is not on file.     Medications:  Current Outpatient Medications   Medication Instructions   • acetaminophen (TYLENOL) 650 mg, oral, Every 6 hours PRN   • atorvastatin (LIPITOR) 20 mg, Daily   • ciprofloxacin-dexamethasone (CiproDEX) otic suspension 4 drops twice a day to the right ear starting 1 week before your follow up appointment with Dr. Houser   • docusate sodium (COLACE) 100 mg, oral, 2 times daily, Take while using narcotics for pain control   • hydroCHLOROthiazide (HYDRODIURIL) 25 mg, Daily   • ibuprofen 600 mg, oral, Every 6 hours PRN   • irbesartan (AVAPRO) 300 mg, Daily   • ondansetron (ZOFRAN) 4 mg, oral, Every 8 hours PRN   • traMADol (ULTRAM) 50 mg, oral, Every 4 hours PRN      Allergies:  Patient has no known allergies.    Review of Systems:   A comprehensive 10-point review of systems was obtained including constitutional, neurological, HEENT, pulmonary, cardiovascular, genito-urinary, and other pertinent systems and was negative except as noted in  "the HPI.      Objective  Physical Exam:  Last Recorded Vitals: Temperature 36.1 °C (97 °F), height 1.803 m (5' 11\"), weight 103 kg (228 lb).    On physical exam, the patient is a well-nourished, well-developed patient, in no acute distress, able to communicate without assistance in English language. Head and face is atraumatic and normocephalic. Salivary glands are intact. Facial strength is symmetrical bilaterally.       On ear examination:  Right ear: The patient has packing which was removed. The neotympanum is intact and in good condition.   AC>BC  Left ear: The patient has an open and patent ear canal. There is adhesive middle ear disease with erosion of the incus with incudostapedopexy  BC>AC  The Duran is right    On vestibular exam, the patient has no spontaneous nystagmus, no headshake nystagmus, no head-thrust nystagmus, and no nystagmus on hyperventilation or Valsalva maneuvers. Emma-Hallpike maneuver is negative bilaterally.       On neuro exam, the patient is alert and oriented x3, cranial nerves are grossly intact, cerebellar exam is normal.      The rest of the exam, including anterior rhinoscopy, oropharyngeal exam, neck exam, and cardiovascular exam, were normal including no palpable lymphadenopathies, thyroid in the midline position, normal pulses, and normal chest excursion.       Reviewed Results:  Audiology Testing:  I personally reviewed the audiogram from 05/2024 that showed:            Right ear: moderate mixed hearing loss with 10 decibels of airborne gap. Discrimination: 92%            Left ear: moderate mixed hearing loss with 10 decibels of airborne gap. Discrimination: 92%    Imaging:  I personally reviewed the CT of the temporal bone from 06/03/24 that showed:  Right sided cholesteatoma of the meso and retro tympanum with an intact stapes and possible incus erosion. The mastoid is clean. On the left side, he has an atelectatic drum with erosion of the long process of the incus.   " "  Procedure:  None    Assessment/Plan    1. Cholesteatoma of attic of right ear    2. Adhesive middle ear disease of both sides    3. Discontinuity of ossicles of both ears    4. Mixed hearing loss, bilateral        In summary, Prakash Villalta II \"Pierre\" is a 59 y.o. male with bilateral adhesive middle ear disease and multiple PE tubes in the past and current bilateral mixed hearing loss.  The left side is his better hearing ear. He has adhesive middle ear disease with erosion of the long process of the incus and pexy over the stapes providing good conduction of sound.      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     I took the patient to the operating room on 10/03/24 and performed a right tympanoplasty with antrotomy, OCR, cartilage graft, and removal of cholesteatoma. The Neotympanum is intact and in good condition.    - Continue drops for 2 more weeks.  - Return to regular activities.  - Continue dry ear precautions.  - Follow up in 3 months with an Audiogram.     Scribe Attestation  By signing my name below, I, Isabelle Avalos , Scribe   attest that this documentation has been prepared under the direction and in the presence of Eitan Gill MD.   ____________________________________________________  Eitan Houser MD  Professor and Chief   Otology/Neurotology/Lateral Skull-Base Surgery   [facility]      "

## 2025-02-14 ENCOUNTER — APPOINTMENT (OUTPATIENT)
Dept: AUDIOLOGY | Facility: CLINIC | Age: 60
End: 2025-02-14
Payer: COMMERCIAL

## 2025-02-14 ENCOUNTER — CLINICAL SUPPORT (OUTPATIENT)
Dept: AUDIOLOGY | Facility: CLINIC | Age: 60
End: 2025-02-14
Payer: COMMERCIAL

## 2025-02-14 ENCOUNTER — APPOINTMENT (OUTPATIENT)
Dept: OTOLARYNGOLOGY | Facility: CLINIC | Age: 60
End: 2025-02-14
Payer: COMMERCIAL

## 2025-02-14 DIAGNOSIS — H74.13 ADHESIVE MIDDLE EAR DISEASE OF BOTH SIDES: ICD-10-CM

## 2025-02-14 DIAGNOSIS — H93.13 TINNITUS, BILATERAL: ICD-10-CM

## 2025-02-14 DIAGNOSIS — H71.01 CHOLESTEATOMA OF ATTIC OF RIGHT EAR: Primary | ICD-10-CM

## 2025-02-14 DIAGNOSIS — H74.23 DISCONTINUITY OF OSSICLES OF BOTH EARS: ICD-10-CM

## 2025-02-14 DIAGNOSIS — H71.01 CHOLESTEATOMA OF ATTIC OF RIGHT EAR: ICD-10-CM

## 2025-02-14 DIAGNOSIS — H90.6 MIXED HEARING LOSS, BILATERAL: ICD-10-CM

## 2025-02-14 DIAGNOSIS — H90.6 MIXED HEARING LOSS, BILATERAL: Primary | ICD-10-CM

## 2025-02-14 DIAGNOSIS — H61.23 BILATERAL IMPACTED CERUMEN: ICD-10-CM

## 2025-02-14 PROBLEM — H60.502 ACUTE OTITIS EXTERNA OF LEFT EAR: Status: RESOLVED | Noted: 2024-04-15 | Resolved: 2025-02-14

## 2025-02-14 PROBLEM — J35.2 ADENOIDAL HYPERTROPHY: Status: RESOLVED | Noted: 2024-04-15 | Resolved: 2025-02-14

## 2025-02-14 PROBLEM — H90.3 SENSORINEURAL HEARING LOSS, ASYMMETRICAL: Status: RESOLVED | Noted: 2024-04-15 | Resolved: 2025-02-14

## 2025-02-14 PROBLEM — H65.22 LEFT CHRONIC SEROUS OTITIS MEDIA: Status: RESOLVED | Noted: 2024-04-15 | Resolved: 2025-02-14

## 2025-02-14 PROCEDURE — V5014 HEARING AID REPAIR/MODIFYING: HCPCS | Performed by: AUDIOLOGIST

## 2025-02-14 PROCEDURE — 92557 COMPREHENSIVE HEARING TEST: CPT | Performed by: AUDIOLOGIST

## 2025-02-14 PROCEDURE — 92567 TYMPANOMETRY: CPT | Performed by: AUDIOLOGIST

## 2025-02-14 ASSESSMENT — COLUMBIA-SUICIDE SEVERITY RATING SCALE - C-SSRS: 1. IN THE PAST MONTH, HAVE YOU WISHED YOU WERE DEAD OR WISHED YOU COULD GO TO SLEEP AND NOT WAKE UP?: NO

## 2025-02-14 ASSESSMENT — PAIN SCALES - GENERAL: PAINLEVEL_OUTOF10: 0-NO PAIN

## 2025-02-14 ASSESSMENT — PATIENT HEALTH QUESTIONNAIRE - PHQ9
1. LITTLE INTEREST OR PLEASURE IN DOING THINGS: NOT AT ALL
SUM OF ALL RESPONSES TO PHQ9 QUESTIONS 1 AND 2: 0
2. FEELING DOWN, DEPRESSED OR HOPELESS: NOT AT ALL

## 2025-02-14 NOTE — LETTER
"February 14, 2025     Tye Hayward DMD, MD  [address] [facility] [address]    Patient: Pierre Villalta II   YOB: 1965   Date of Visit: 2/14/2025       Dear Dr. Tye Hayward DMD, MD:    Thank you for referring Pierre Villalta II to me for evaluation. Below are my notes for this consultation.  If you have questions, please do not hesitate to call me. I look forward to following your patient along with you.       Sincerely,     Eitan Gill MD      CC: Landon Morales MD  ______________________________________________________________________________________            Reason for Consult:  Post-op      Subjective  History Of Present Illness:  Prakash Villalta II \"Pierre\" is a 59 y.o. male with bilateral adhesive middle ear disease and multiple PE tubes in the past and current bilateral mixed hearing loss.    The left side is his better hearing ear. He has adhesive middle ear disease with erosion of the long process of the incus and pexy over the stapes providing good conduction of sound.      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      I took the patient to the operating room on 10/03/24 and performed a right tympanoplasty with antrotomy, OCR, cartilage graft, and removal of cholesteatoma.      During surgery we encountered:  1. Adhesive middle ear disease eroding a portion of the scutum, atelectatic drum and severe retraction over the lateral epitympanum (prusak space), posterior mesotympanum, and retrotympanum including sinus tympani and subtympanic sinus. It also covered the eroded long process of the incus as well as the stapes capitulum, and chorda tympani nerve.  2. Cholesteatoma location: posterior mesotympanum and retrotympanum including sinus tympani and subtympanic sinus.  3. Ossicular chain:   - Malleus: present. Tensor tympani tendon was cut.  - Incus:  Present; long process " eroded and incus was removed .  - Stapes: Suprastructure present but very mobile. Footplate mobile.  4. Chorda present and preserved.   5. Moderate middle ear synechiae resected.  6. Middle ear mucosa moderately inflamed.   7. OCR: Padmini medical Precise PORP 2.0mm placed.  8. Facial nerve: Not dehiscent in tympanic segment.  9. Eustachian tube: Open.     Since surgery the patient has been doing well and has not had any major problems. Both patient and family noticed his hearing has improved.     Past Medical History:  He has a past medical history of Personal history of other diseases of the circulatory system.    Surgical History:  He has a past surgical history that includes Tympanostomy tube placement (08/11/2016); Nasal septum surgery (08/11/2016); Adenoidectomy (08/11/2016); and Colonoscopy.     Social History:  He reports that he has never smoked. He has never used smokeless tobacco. He reports that he does not currently use alcohol. He reports that he does not use drugs.    Family History:  family history is not on file.     Medications:  Current Outpatient Medications   Medication Instructions   • acetaminophen (TYLENOL) 650 mg, oral, Every 6 hours PRN   • atorvastatin (LIPITOR) 20 mg, Daily   • ciprofloxacin-dexamethasone (CiproDEX) otic suspension 4 drops twice a day to the right ear starting 1 week before your follow up appointment with Dr. Houser   • docusate sodium (COLACE) 100 mg, oral, 2 times daily, Take while using narcotics for pain control   • hydroCHLOROthiazide (HYDRODIURIL) 25 mg, Daily   • ibuprofen 600 mg, oral, Every 6 hours PRN   • irbesartan (AVAPRO) 300 mg, Daily   • ondansetron (ZOFRAN) 4 mg, oral, Every 8 hours PRN   • traMADol (ULTRAM) 50 mg, oral, Every 4 hours PRN      Allergies:  Patient has no known allergies.    Review of Systems:   A comprehensive 10-point review of systems was obtained including constitutional, neurological, HEENT, pulmonary, cardiovascular, genito-urinary, and  other pertinent systems and was negative except as noted in the HPI.     Objective  Physical Exam:  Last Recorded Vitals: There were no vitals taken for this visit.    On physical exam, the patient is a well-nourished, well-developed patient, in no acute distress, able to communicate without assistance in English language. Head and face is atraumatic and normocephalic. Salivary glands are intact. Facial strength is symmetrical bilaterally.       On ear examination:  Right ear: The patient has cerumen impaction which was removed. The neotympanum is intact.   AC>BC  Left ear: The patient has cerumen impaction which was removed. The tympanic membrane is intact anteriorly dehiscence middle ear disease with erosion of the long process of the incus and pexy of the tympanic membrane over the capitulum.  AC>BC  The Duran is midline    On vestibular exam, the patient has no spontaneous nystagmus, no headshake nystagmus, no head-thrust nystagmus, and no nystagmus on hyperventilation or Valsalva maneuvers. Bird In Hand-Hallpike maneuver is negative bilaterally.       On neuro exam, the patient is alert and oriented x3, cranial nerves are grossly intact, cerebellar exam is normal.      The rest of the exam, including anterior rhinoscopy, oropharyngeal exam, neck exam, and cardiovascular exam, were normal including no palpable lymphadenopathies, thyroid in the midline position, normal pulses, and normal chest excursion.       Reviewed Results:  Audiology Testing:  I personally reviewed the audiogram from 02/2025 that showed:   Right ear: mild mixed hearing loss bilaterally within 10 db of air-bone gap. Discrimination: 96%   Left ear: mild mixed hearing loss bilaterally . Discrimination: 92%       I personally reviewed the audiogram from 05/2024 that showed:            Right ear: moderate mixed hearing loss with 10 decibels of airborne gap. Discrimination: 92%            Left ear: moderate mixed hearing loss with 10 decibels of airborne  "gap. Discrimination: 92%    Imaging:  I personally reviewed the CT of the temporal bone from 06/03/24 that showed:  Right sided cholesteatoma of the meso and retro tympanum with an intact stapes and possible incus erosion. The mastoid is clean. On the left side, he has an atelectatic drum with erosion of the long process of the incus.       Procedure:  Bilateral Cerumen Disimpaction    Assessment/Plan    1. Cholesteatoma of attic of right ear    2. Adhesive middle ear disease of both sides    3. Discontinuity of ossicles of both ears    4. Mixed hearing loss, bilateral    5. Tinnitus, bilateral    6. Bilateral impacted cerumen        In summary, Prakash Villalta II \"Pierre\" is a 59 y.o. male with bilateral adhesive middle ear disease and multiple PE tubes in the past and current bilateral mixed hearing loss.    The left side is his better hearing ear. He has adhesive middle ear disease with erosion of the long process of the incus and TM pexy over the stapes providing good conduction of sound.      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      I took the patient to the operating room on 10/03/24 and performed a right tympanoplasty with antrotomy, OCR, cartilage graft, and removal of cholesteatoma. The Neotympanum is intact and in good condition. We achieved closure of the air-bone gap within 10 db. His hearing is now symmetric    The left ear has an incudostapedopexy without a significant air-bone gap and he does NOT require surgery at this time. We will continue with observation    Follow-up in  6 months with an audiogram. He may go back to his regular activities          ____________________________________________________  Eitan Houser MD  Professor and Chief   Otology/Neurotology/Lateral Skull-Base Surgery   [facility]    Scribe Attestation  By signing my name below, Sukhdeep VALENZUELA" Anderson Paula   attest that this documentation has been prepared under the direction and in the presence of Eitan Gill MD.

## 2025-02-14 NOTE — PROGRESS NOTES
"        Reason for Consult:  Post-op      Subjective   History Of Present Illness:  Prakash Villalta II \"Pierre\" is a 59 y.o. male with bilateral adhesive middle ear disease and multiple PE tubes in the past and current bilateral mixed hearing loss.    The left side is his better hearing ear. He has adhesive middle ear disease with erosion of the long process of the incus and pexy over the stapes providing good conduction of sound.      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      I took the patient to the operating room on 10/03/24 and performed a right tympanoplasty with antrotomy, OCR, cartilage graft, and removal of cholesteatoma.      During surgery we encountered:  1. Adhesive middle ear disease eroding a portion of the scutum, atelectatic drum and severe retraction over the lateral epitympanum (prusak space), posterior mesotympanum, and retrotympanum including sinus tympani and subtympanic sinus. It also covered the eroded long process of the incus as well as the stapes capitulum, and chorda tympani nerve.  2. Cholesteatoma location: posterior mesotympanum and retrotympanum including sinus tympani and subtympanic sinus.  3. Ossicular chain:   - Malleus: present. Tensor tympani tendon was cut.  - Incus:  Present; long process eroded and incus was removed .  - Stapes: Suprastructure present but very mobile. Footplate mobile.  4. Chorda present and preserved.   5. Moderate middle ear synechiae resected.  6. Middle ear mucosa moderately inflamed.   7. OCR: [employer] Precise PORP 2.0mm placed.  8. Facial nerve: Not dehiscent in tympanic segment.  9. Eustachian tube: Open.     Since surgery the patient has been doing well and has not had any major problems. Both patient and family noticed his hearing has improved.     Past Medical History:  He has a past medical history of Personal history of other " diseases of the circulatory system.    Surgical History:  He has a past surgical history that includes Tympanostomy tube placement (08/11/2016); Nasal septum surgery (08/11/2016); Adenoidectomy (08/11/2016); and Colonoscopy.     Social History:  He reports that he has never smoked. He has never used smokeless tobacco. He reports that he does not currently use alcohol. He reports that he does not use drugs.    Family History:  family history is not on file.     Medications:  Current Outpatient Medications   Medication Instructions    acetaminophen (TYLENOL) 650 mg, oral, Every 6 hours PRN    atorvastatin (LIPITOR) 20 mg, Daily    ciprofloxacin-dexamethasone (CiproDEX) otic suspension 4 drops twice a day to the right ear starting 1 week before your follow up appointment with Dr. Houser    docusate sodium (COLACE) 100 mg, oral, 2 times daily, Take while using narcotics for pain control    hydroCHLOROthiazide (HYDRODIURIL) 25 mg, Daily    ibuprofen 600 mg, oral, Every 6 hours PRN    irbesartan (AVAPRO) 300 mg, Daily    ondansetron (ZOFRAN) 4 mg, oral, Every 8 hours PRN    traMADol (ULTRAM) 50 mg, oral, Every 4 hours PRN      Allergies:  Patient has no known allergies.    Review of Systems:   A comprehensive 10-point review of systems was obtained including constitutional, neurological, HEENT, pulmonary, cardiovascular, genito-urinary, and other pertinent systems and was negative except as noted in the HPI.     Objective   Physical Exam:  Last Recorded Vitals: There were no vitals taken for this visit.    On physical exam, the patient is a well-nourished, well-developed patient, in no acute distress, able to communicate without assistance in English language. Head and face is atraumatic and normocephalic. Salivary glands are intact. Facial strength is symmetrical bilaterally.       On ear examination:  Right ear: The patient has cerumen impaction which was removed. The neotympanum is intact.   AC>BC  Left ear: The patient  has cerumen impaction which was removed. The tympanic membrane is intact anteriorly dehiscence middle ear disease with erosion of the long process of the incus and pexy of the tympanic membrane over the capitulum.  AC>BC  The Duran is midline    On vestibular exam, the patient has no spontaneous nystagmus, no headshake nystagmus, no head-thrust nystagmus, and no nystagmus on hyperventilation or Valsalva maneuvers. Emma-Hallpike maneuver is negative bilaterally.       On neuro exam, the patient is alert and oriented x3, cranial nerves are grossly intact, cerebellar exam is normal.      The rest of the exam, including anterior rhinoscopy, oropharyngeal exam, neck exam, and cardiovascular exam, were normal including no palpable lymphadenopathies, thyroid in the midline position, normal pulses, and normal chest excursion.       Reviewed Results:  Audiology Testing:  I personally reviewed the audiogram from 02/2025 that showed:   Right ear: mild mixed hearing loss bilaterally within 10 db of air-bone gap. Discrimination: 96%   Left ear: mild mixed hearing loss bilaterally . Discrimination: 92%       I personally reviewed the audiogram from 05/2024 that showed:            Right ear: moderate mixed hearing loss with 10 decibels of airborne gap. Discrimination: 92%            Left ear: moderate mixed hearing loss with 10 decibels of airborne gap. Discrimination: 92%    Imaging:  I personally reviewed the CT of the temporal bone from 06/03/24 that showed:  Right sided cholesteatoma of the meso and retro tympanum with an intact stapes and possible incus erosion. The mastoid is clean. On the left side, he has an atelectatic drum with erosion of the long process of the incus.       Procedure:  Bilateral Cerumen Disimpaction    Assessment/Plan     1. Cholesteatoma of attic of right ear    2. Adhesive middle ear disease of both sides    3. Discontinuity of ossicles of both ears    4. Mixed hearing loss, bilateral    5. Tinnitus,  "bilateral    6. Bilateral impacted cerumen        In summary, Prakash Villalta II \"Pierre\" is a 59 y.o. male with bilateral adhesive middle ear disease and multiple PE tubes in the past and current bilateral mixed hearing loss.    The left side is his better hearing ear. He has adhesive middle ear disease with erosion of the long process of the incus and TM pexy over the stapes providing good conduction of sound.      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      I took the patient to the operating room on 10/03/24 and performed a right tympanoplasty with antrotomy, OCR, cartilage graft, and removal of cholesteatoma. The Neotympanum is intact and in good condition. We achieved closure of the air-bone gap within 10 db. His hearing is now symmetric    The left ear has an incudostapedopexy without a significant air-bone gap and he does NOT require surgery at this time. We will continue with observation    Follow-up in  6 months with an audiogram. He may go back to his regular activities          ____________________________________________________  Eitan Houser MD  Professor and Chief   Otology/Neurotology/Lateral Skull-Base Surgery   [facility]    Scribe Attestation  By signing my name below, I, Sukhdeep Paula, Scribe   attest that this documentation has been prepared under the direction and in the presence of Eitan Gill MD.  "

## 2025-02-14 NOTE — PROGRESS NOTES
[facility]UDIOLOGY  WALK IN  HEARING AID CHECK      Name:  Prakash Villalta II  YOB: 1965  Today's date:  02/14/25    Patient here for a post-op visit with Dr. Houser with an updated audiogram.    Cleaned both aids and reprogrammed to today's audiogram.  Fair listening check noted in the office.    Gave aids back to the patient.  $15.00        Kenya Roque  Doctor of Audiology  Senior Audiologist

## 2025-02-14 NOTE — PROGRESS NOTES
[facility] AUDIOLOGY  AUDIOMETRIC EVALUATION      Name:  Prakash Villalta II   :  1965  Age:  59 y.o.  Date of Evaluation:  25    HISTORY    Prakash Villalta II is seen today at the request of Eitan Gill M.D.  The patient is  an established patient, post-op right ear surgery.    EVALUATION  See scanned audiogram in Media and included at the end of this report.    RESULTS    Otoscopic Evaluation:  Right Ear:  excessive but non-occluding cerumen   Left Ear:  clear    Tympanometry:   Right Ear:  Type B, suggestive of middle ear fluid   Left Ear:  Type C, consistent with negative middle ear pressure      Pure Tone Audiometry:    Right Ear:  normal to moderate mixed hearing loss, improved  Left Ear:  normal to moderate mixed hearing loss, stable       Speech Audiometry:    Right Ear:  excellent in quiet at a normal presentation level  Left Ear:  excellent in quiet at a normal presentation level  Speech reception thresholds were in good agreement with pure tone testing.    DISCUSSION  Results were relayed to Eitan Gill M.D.    APPOINTMENT TIME  8:30am-9:00am     Philly Roque  Doctor of Audiology  Senior Audiologist

## 2025-09-12 ENCOUNTER — APPOINTMENT (OUTPATIENT)
Dept: AUDIOLOGY | Facility: CLINIC | Age: 60
End: 2025-09-12
Payer: COMMERCIAL

## 2025-09-12 ENCOUNTER — APPOINTMENT (OUTPATIENT)
Dept: OTOLARYNGOLOGY | Facility: CLINIC | Age: 60
End: 2025-09-12
Payer: COMMERCIAL

## 2025-09-16 ENCOUNTER — APPOINTMENT (OUTPATIENT)
Dept: OTOLARYNGOLOGY | Facility: CLINIC | Age: 60
End: 2025-09-16
Payer: COMMERCIAL

## 2025-09-16 ENCOUNTER — APPOINTMENT (OUTPATIENT)
Dept: AUDIOLOGY | Facility: CLINIC | Age: 60
End: 2025-09-16
Payer: COMMERCIAL

## (undated) DEVICE — DRESSING, NON-ADHERENT, TELFA, 3 X 8 IN, NS

## (undated) DEVICE — STOCKINETTE, IMPERVIOUS, 12 X 48 IN, LF, STERILE

## (undated) DEVICE — DRAPE, MICROSCOPE, W/REMOVABLE LENS

## (undated) DEVICE — SYRINGE, 60 CC, IRRIGATION, BULB, CONTRO-BULB, PAPER POUCH

## (undated) DEVICE — SPONGE, GAUZE, XRAY DECT, 16 PLY, 4 X 4, W/MASTER DMT,STERILE

## (undated) DEVICE — 12MM MEDTRONIC PAIRED SUBDERMAL ELECTRODE, 2-CHANNEL, 12.0MM***DUPLICATE, PLEASE TRANSITION TO CAT #8227410

## (undated) DEVICE — ELECTRODE, ELECTROSURGICAL, BLADE, INSULATED, ENT/IMA, STERILE

## (undated) DEVICE — DRAPE, SURGICAL, OTOLOGY GLASSCOCK

## (undated) DEVICE — BALL, COTTON, STANDARD, STERILE

## (undated) DEVICE — DRAPE, SHEET, 17 X 23 IN

## (undated) DEVICE — FORCEP, BIPOLAR, CUSHING, 7 1/2 BAYONET, MICRO-TIP, FOOTSWITCHING"

## (undated) DEVICE — DRESSING, TRANSPARENT, TEGADERM, 2-3/8 X 2-3/4 IN

## (undated) DEVICE — ADHESIVE, SKIN, MASTISOL, 2/3 CC VIAL

## (undated) DEVICE — SUTURE, VICRYL, 4-0, 27 IN, FS-2, UNDYED

## (undated) DEVICE — Device

## (undated) DEVICE — GLOVE, SURGICAL, PROTEXIS PI , 7.5, PF, LF

## (undated) DEVICE — TUBING, SUCTION, OTOMED

## (undated) DEVICE — SYRINGE, 10 CC, LUER LOCK

## (undated) DEVICE — SIZERS, OTOLOGIC MOLDED, POLYPROPYLENE

## (undated) DEVICE — SUTURE, PLAIN, 5-0, 18 IN, PC1, YELLOW

## (undated) DEVICE — SYRINGE, INSULIN, LUER LOCK, U-100, 1ML

## (undated) DEVICE — TOWEL, SURGICAL, NEURO, O/R, 16 X 26, BLUE, STERILE

## (undated) DEVICE — BLADE, TYPANOPLASTY, 60 DEG ANGLED, 2.5MM

## (undated) DEVICE — GOWN, SURGICAL, REINFORCED, XLARGE, X-LONG, STERILE

## (undated) DEVICE — TRAY, DRY PREP, PREMIUM

## (undated) DEVICE — CATHETER, IV, ANGIOCATH, 14 G X 1.16 IN, FEP POLYMER

## (undated) DEVICE — SYRINGE, 5 CC, LUER LOCK